# Patient Record
Sex: MALE | Race: WHITE | Employment: FULL TIME | URBAN - METROPOLITAN AREA
[De-identification: names, ages, dates, MRNs, and addresses within clinical notes are randomized per-mention and may not be internally consistent; named-entity substitution may affect disease eponyms.]

---

## 2017-04-14 ENCOUNTER — TELEPHONE (OUTPATIENT)
Dept: NURSING | Facility: CLINIC | Age: 42
End: 2017-04-14

## 2017-04-14 ENCOUNTER — OFFICE VISIT (OUTPATIENT)
Dept: FAMILY MEDICINE | Facility: CLINIC | Age: 42
End: 2017-04-14
Payer: COMMERCIAL

## 2017-04-14 VITALS
BODY MASS INDEX: 23.79 KG/M2 | SYSTOLIC BLOOD PRESSURE: 110 MMHG | HEIGHT: 68 IN | DIASTOLIC BLOOD PRESSURE: 75 MMHG | WEIGHT: 157 LBS | TEMPERATURE: 97.8 F | HEART RATE: 70 BPM

## 2017-04-14 DIAGNOSIS — R68.83 CHILLS (WITHOUT FEVER): ICD-10-CM

## 2017-04-14 DIAGNOSIS — J02.9 VIRAL PHARYNGITIS: Primary | ICD-10-CM

## 2017-04-14 LAB
DEPRECATED S PYO AG THROAT QL EIA: NORMAL
FLUAV+FLUBV AG SPEC QL: NEGATIVE
FLUAV+FLUBV AG SPEC QL: NORMAL
MICRO REPORT STATUS: NORMAL
SPECIMEN SOURCE: NORMAL
SPECIMEN SOURCE: NORMAL

## 2017-04-14 PROCEDURE — 87880 STREP A ASSAY W/OPTIC: CPT | Performed by: FAMILY MEDICINE

## 2017-04-14 PROCEDURE — 99213 OFFICE O/P EST LOW 20 MIN: CPT | Performed by: FAMILY MEDICINE

## 2017-04-14 PROCEDURE — 87081 CULTURE SCREEN ONLY: CPT | Performed by: FAMILY MEDICINE

## 2017-04-14 PROCEDURE — 87804 INFLUENZA ASSAY W/OPTIC: CPT | Performed by: FAMILY MEDICINE

## 2017-04-14 ASSESSMENT — PAIN SCALES - GENERAL: PAINLEVEL: MODERATE PAIN (5)

## 2017-04-14 NOTE — TELEPHONE ENCOUNTER
Call Type: Triage Call    Presenting Problem: Lonnie has sever back pain, started about 2 days  ago, worse yesterday. No known injury or trauma. Also deep cough.  Lower back pain, constant, central, rates pain 7/10. Pain is better  when not moving. Unsure of fever status, has not checked but feels  feverish.  Triage Note:  Guideline Title: Back Symptoms  Recommended Disposition: See Provider within 24 hours  Original Inclination: Wanted to speak with a nurse  Override Disposition:  Intended Action: See Dr/Jonas Appt  Physician Contacted: No  Pain intensifies with coughing, sneezing or straining ?  YES  New or worsening signs and symptoms that may indicate shock ? NO  Pregnant,  less than 20 weeks gestation ? NO  Severe back pain AND history of IV drug use, alcoholism, or previous spinal trauma  ? NO  Pregnant and gestation greater than 37 weeks AND low backache/pain that is  constant or increasing in intensity ? NO  Pregnant and gestation 20-37 weeks AND low backache/pain that is constant or  increasing in intensity ? NO  Following significant trauma or injury to neck or back AND immobile since event ?  NO  Pregnant, back symptoms AND no signs of labor ? NO  Burning or tingling feeling, itchiness or stabbing pain in a localized area on one  side of body followed by reddened fluid-filled blisters that break and crust ? NO  Back pain associated with any breathing symptoms (such as noisy breathing,  struggling to breathe, sudden change in respiratory rate) ? NO  New paralysis (unable to move); new weakness (not due to pain); new loss of  coordination (purposeful action) OR new unexplained numbness/tingling involving  arm and leg on same side of body ? NO  Following significant trauma AND has been mobile since injury but is now having  back or neck pain ? NO  Age 50 years or older with sudden onset of deep boring or tearing pain in back OR  abdomen; may radiate to groin, hips or lower extremities ? NO  Pain predominately  in flank area ? NO  Urinary tract symptoms are primary symptoms ? NO  Urinary tract symptoms are primary symptoms ? NO  New onset back pain associated with numbness or weakness in both legs ? NO  Fever of 100.5 F (38.1 C) or higher associated with back symptoms ? NO  Low back pain AND urinary tract symptoms ? NO  Back pain radiates into thigh or below knee ? NO  New onset or unexplained change in bowel or bladder control (unable to urinate and  full feeling or loss of control of bowel or bladder) ? NO  Numbness in the groin and saddle area of pelvis AND lower extremity weakness OR  change in bowel or bladder control (loss of control, retention, overflow) ? NO  Painful spasms or cramping of large muscle groups (back, legs or abdomen) AND  recent heat exposure ? NO  Any other cardiac signs/symptoms for more than 5 minutes, now or within last hour.  Pain is NOT associated with taking a deep breath or a productive cough, movement,  or touch to a localized area on the chest or upper body. ? NO  Pain predominately in the neck ? NO  New onset of severe disabling back pain (unable to stand upright; pain wakens you  from sleep; constant or intense pain when lying down) ? NO  Any temperature elevation in an immunocompromised individual OR frail elderly with  signs of dehydration ? NO  Physician Instructions:  Care Advice: Call provider if symptoms worsen or new symptoms develop.  Go to the ED if you have worsening pain, numbness or weakness of arms or  legs, cannot walk, or have new unexplained changes in bladder or bowel  function. Another adult should drive.  To relieve discomfort and swelling, apply a cloth-covered cold pack to the  area for 20 minutes 4 to 8 times a day for the first 24 to 48 hours. After  24 to 48 hours of cold application, use a cloth-covered heat pack to the  area for 20 minutes 3 to 4 times a day.

## 2017-04-14 NOTE — LETTER
[facility]  [address] [facility] [address]  Phone: [phone]    April 17, 2017    Lonnie Hester  [address] [place] [id number]              Dear Mr. Hester,        The results of your recent throat culture were negative. If you have any further questions or concerns please contact the clinic.          Sincerely,      Nicholas Moreno MD

## 2017-04-14 NOTE — NURSING NOTE
"Chief Complaint   Patient presents with     Musculoskeletal Problem       Initial /75  Pulse 70  Temp 97.8  F (36.6  C) (Tympanic)  Ht 5' 8\" (1.727 m)  Wt 157 lb (71.2 kg)  BMI 23.87 kg/m2 Estimated body mass index is 23.87 kg/(m^2) as calculated from the following:    Height as of this encounter: 5' 8\" (1.727 m).    Weight as of this encounter: 157 lb (71.2 kg).  Medication Reconciliation: complete     Gail Askew CMA      "

## 2017-04-14 NOTE — PATIENT INSTRUCTIONS
The flu will go away on its own after 3-5 fairly miserable days in most health people.  There is a medication to reduce the severity and duration by a day or so but it must be given within the first 24 hours of symptoms to be effective.  Additionally, this medication can be given to close contacts as well to prevent the illness.  This would be recommended for anyone who is unimmunized and is below a year of age, a nursing home resident or anyone with severe heart, lung or immune system issues.  As always, immunization in the Fall is the best approach to preventing serious illness and we recommend it for everyone who qualifies for immunization.     In the meantime, acetaminophen, ibuprofen or naproxen can help with pain and fever.  Drinking plenty of fluids will help with dehydration. Rest and time are important. Avoid unnecessary travel or contact with others.  If you do need to be around others, you should wear a mask.  If you have a worsening of symptoms including confusion, shortness of breath, severe chest pain, etc, you should seek care.

## 2017-04-14 NOTE — MR AVS SNAPSHOT
After Visit Summary   4/14/2017    Lonnie Hester    MRN: [id number]           Patient Information     Date Of Birth          1975        Visit Information        Provider Department      4/14/2017 10:00 AM Nicholas Moreno MD [facility]        Today's Diagnoses     Viral pharyngitis    -  1    Chills (without fever)          Care Instructions    The flu will go away on its own after 3-5 fairly miserable days in most health people.  There is a medication to reduce the severity and duration by a day or so but it must be given within the first 24 hours of symptoms to be effective.  Additionally, this medication can be given to close contacts as well to prevent the illness.  This would be recommended for anyone who is unimmunized and is below a year of age, a nursing home resident or anyone with severe heart, lung or immune system issues.  As always, immunization in the Fall is the best approach to preventing serious illness and we recommend it for everyone who qualifies for immunization.     In the meantime, acetaminophen, ibuprofen or naproxen can help with pain and fever.  Drinking plenty of fluids will help with dehydration. Rest and time are important. Avoid unnecessary travel or contact with others.  If you do need to be around others, you should wear a mask.  If you have a worsening of symptoms including confusion, shortness of breath, severe chest pain, etc, you should seek care.         Follow-ups after your visit        Who to contact     Normal or non-critical lab and imaging results will be communicated to you by [employer], letter or phone within 4 business days after the clinic has received the results. If you do not hear from us within 7 days, please contact the clinic through MyChart or phone. If you have a critical or abnormal lab result, we will notify you by phone as soon as possible.  Submit refill requests through [employer] or call your pharmacy and they will forward  "the refill request to us. Please allow 3 business days for your refill to be completed.          If you need to speak with a  for additional information , please call: [phone]           Additional Information About Your Visit        [employer] Information     [employer] gives you secure access to your electronic health record. If you see a primary care provider, you can also send messages to your care team and make appointments. If you have questions, please call your primary care clinic.  If you do not have a primary care provider, please call [phone] and they will assist you.        Care EveryWhere ID     This is your Care EveryWhere ID. This could be used by other organizations to access your [place] medical records  [id number]        Your Vitals Were     Pulse Temperature Height BMI (Body Mass Index)          70 97.8  F (36.6  C) (Tympanic) 5' 8\" (1.727 m) 23.87 kg/m2         Blood Pressure from Last 3 Encounters:   04/14/17 110/75   06/19/15 133/80   06/03/15 126/80    Weight from Last 3 Encounters:   04/14/17 157 lb (71.2 kg)   06/19/15 157 lb (71.2 kg)   06/03/15 156 lb 4.8 oz (70.9 kg)              We Performed the Following     Beta strep group A culture     Influenza A/B antigen     Rapid strep screen        Primary Care Provider    Doctor Unknown, MD       No address on file        Thank you!     Thank you for choosing [facility]  for your care. Our goal is always to provide you with excellent care. Hearing back from our patients is one way we can continue to improve our services. Please take a few minutes to complete the written survey that you may receive in the mail after your visit with us. Thank you!             Your Updated Medication List - Protect others around you: Learn how to safely use, store and throw away your medicines at www.disposemymeds.org.          This list is accurate as of: 4/14/17 10:41 AM.  Always use your most recent med list.             "       Brand Name Dispense Instructions for use    ascorbic acid 1000 MG Tabs    vitamin C     Take 1,000 mg by mouth daily Reported on 4/14/2017       tiZANidine 4 MG tablet    ZANAFLEX    90 tablet    Take 1 tablet (4 mg) by mouth 3 times daily as needed for muscle spasms

## 2017-04-14 NOTE — PROGRESS NOTES
".  SUBJECTIVE:                                                    Lonnie Hester is a 42 year old male who presents to clinic today for the following health issues:      ENT Symptoms             Symptoms: cc Present Absent Comment   Fever/Chills  x     Fatigue  x     Muscle Aches  x     Eye Irritation   x    Sneezing   x    Nasal Art/Drg  x     Sinus Pressure/Pain   x    Loss of smell   x    Dental pain   x    Sore Throat  x     Swollen Glands       Ear Pain/Fullness   x    Cough  x     Wheeze  x     Chest Pain   x    Shortness of breath  x     Rash   x    Other   x      Symptom duration:  since Wednesday    Symptom severity:  moderate   Treatments tried:  Advil cold and sinus    Contacts:  children              Problem list and histories reviewed & adjusted, as indicated.  Additional history: as documented        Reviewed and updated as needed this visit by clinical staff  Tobacco  Allergies  Meds  Med Hx  Surg Hx  Fam Hx  Soc Hx      Reviewed and updated as needed this visit by Provider         1. Viral pharyngitis    2. Chills (without fever)        PMH: Updated and/or reviewed in chart.    PSH: Updated and/or reviewed in chart.    Family History: Updated and/or reviewed in chart.     ROS:  Constitutional, neuro, EMT, endocrine, pulmonary, cardiac, gastrointestinal, genitourinary, musculoskeletal, integument and psychiatric systems are otherwise negative.    OBJECTIVE:                                                    /75  Pulse 70  Temp 97.8  F (36.6  C) (Tympanic)  Ht 5' 8\" (1.727 m)  Wt 157 lb (71.2 kg)  BMI 23.87 kg/m2  GENERAL: Interactive.  Alert and oriented x 3.  No acute distress but uncomfortable  HEENT: Normocephalic, atraumatic. PEERRLA, EOMI.  Scleras, lids and conjunctivae normal. Pinnas, canals and TM's clear.  Nose and oropharynx moist and clear.  NECK: supple and free of adenopathy or masses, the thyroid is normal without enlargement or nodules.  HEART:  S1 and S2 normal, no " murmurs, clicks, gallops or rubs. Regular rate and rhythm.  CHEST:  clear, no wheezing or rales. Normal symmetric air entry throughout both lung fields. No chest wall deformities or tenderness.  SKIN:  Only benign skin findings. No unusual rashes or suspicious skin lesions noted. Nails appear normal.        Results for orders placed or performed in visit on 04/14/17   Influenza A/B antigen   Result Value Ref Range    Influenza A/B Agn Specimen Nasopharyngeal     Influenza A Negative NEG    Influenza B  NEG     Negative   Test results must be correlated with clinical data. If necessary, results   should be confirmed by a molecular assay or viral culture.     Rapid strep screen   Result Value Ref Range    Specimen Description Throat     Rapid Strep A Screen       NEGATIVE: No Group A streptococcal antigen detected by immunoassay, await   culture report.      Micro Report Status FINAL 04/14/2017       ASSESSMENT/PLAN:                                                        ICD-10-CM    1. Viral pharyngitis J02.9 Rapid strep screen     Beta strep group A culture   2. Chills (without fever) R68.83 Influenza A/B antigen     Beta strep group A culture       Care plan updated in chart for chronic problems.    Patient Instructions   The flu will go away on its own after 3-5 fairly miserable days in most health people.  There is a medication to reduce the severity and duration by a day or so but it must be given within the first 24 hours of symptoms to be effective.  Additionally, this medication can be given to close contacts as well to prevent the illness.  This would be recommended for anyone who is unimmunized and is below a year of age, a nursing home resident or anyone with severe heart, lung or immune system issues.  As always, immunization in the Fall is the best approach to preventing serious illness and we recommend it for everyone who qualifies for immunization.     In the meantime, acetaminophen, ibuprofen or  naproxen can help with pain and fever.  Drinking plenty of fluids will help with dehydration. Rest and time are important. Avoid unnecessary travel or contact with others.  If you do need to be around others, you should wear a mask.  If you have a worsening of symptoms including confusion, shortness of breath, severe chest pain, etc, you should seek care.      No orders of the defined types were placed in this encounter.     Goals     None           Nicholas Moreno MD

## 2017-04-17 LAB
BACTERIA SPEC CULT: NORMAL
MICRO REPORT STATUS: NORMAL
SPECIMEN SOURCE: NORMAL

## 2018-08-27 ENCOUNTER — ALLIED HEALTH/NURSE VISIT (OUTPATIENT)
Dept: NURSING | Facility: CLINIC | Age: 43
End: 2018-08-27
Payer: COMMERCIAL

## 2018-08-27 DIAGNOSIS — H93.8X3 PLUGGED FEELING IN EAR, BILATERAL: Primary | ICD-10-CM

## 2018-08-27 PROCEDURE — 99207 ZZC NO CHARGE NURSE ONLY: CPT

## 2018-08-27 NOTE — PROGRESS NOTES
RN ear assessment completed prior to ear irrigation. Otoscopic exam reveals cerumen present and irrigation advised. Post Ear Irrigation exam completed and cerumen plug removed.  Pain assessment completed. none tolerated well   Irrigation done by CMA with out problems good results   Patient tolerated procedure:  yes     Ok to do ear wash per Isaías  Due to pres provider no longer with clinic  advised to make appt to establish care for furture needs  DARREN Andre  Clinic  MARJORIE/Bharat Poe

## 2018-08-27 NOTE — MR AVS SNAPSHOT
After Visit Summary   8/27/2018    Lonnie Hester    MRN: [id number]           Patient Information     Date Of Birth          1975        Visit Information        Provider Department      8/27/2018 8:30 AM FL DANIEL AMADOR [place] [facility] Poe        Today's Diagnoses     Plugged feeling in ear, bilateral    -  1       Follow-ups after your visit        Who to contact     If you have questions or need follow up information about today's clinic visit or your schedule please contact [facility] [place] directly at [phone].  Normal or non-critical lab and imaging results will be communicated to you by MyChart, letter or phone within 4 business days after the clinic has received the results. If you do not hear from us within 7 days, please contact the clinic through [employer] or phone. If you have a critical or abnormal lab result, we will notify you by phone as soon as possible.  Submit refill requests through [employer] or call your pharmacy and they will forward the refill request to us. Please allow 3 business days for your refill to be completed.          Additional Information About Your Visit        MyChart Information     [employer] gives you secure access to your electronic health record. If you see a primary care provider, you can also send messages to your care team and make appointments. If you have questions, please call your primary care clinic.  If you do not have a primary care provider, please call [phone] and they will assist you.        Care EveryWhere ID     This is your Care EveryWhere ID. This could be used by other organizations to access your [place] medical records  [id number]         Blood Pressure from Last 3 Encounters:   04/14/17 110/75   06/19/15 133/80   06/03/15 126/80    Weight from Last 3 Encounters:   04/14/17 157 lb (71.2 kg)   06/19/15 157 lb (71.2 kg)   06/03/15 156 lb 4.8 oz (70.9 kg)              Today, you had the following     No orders found for  display       Primary Care Provider Fax #    Physician No Ref-Primary [phone]       No address on file        Equal Access to Services     KAMAR MELGAR : Hadii aad ku hadsuraj Arellano, yuriyda baldohannah, dylon kirkpatrickda shekhar, brigid jose antonioin hayaabernardo alexjorge parker laLisaraúl harrison. So [facility] [phone].    ATENCIÓN: Si habla español, tiene a lance disposición servicios gratuitos de asistencia lingüística. Llame al [phone].    We comply with applicable federal civil rights laws and [place] laws. We do not discriminate on the basis of race, color, national origin, age, disability, sex, sexual orientation, or gender identity.            Thank you!     Thank you for choosing [facility]  for your care. Our goal is always to provide you with excellent care. Hearing back from our patients is one way we can continue to improve our services. Please take a few minutes to complete the written survey that you may receive in the mail after your visit with us. Thank you!             Your Updated Medication List - Protect others around you: Learn how to safely use, store and throw away your medicines at www.disposemymeds.org.          This list is accurate as of 8/27/18 11:24 AM.  Always use your most recent med list.                   Brand Name Dispense Instructions for use Diagnosis    ascorbic acid 1000 MG Tabs    vitamin C     Take 1,000 mg by mouth daily Reported on 4/14/2017        tiZANidine 4 MG tablet    ZANAFLEX    90 tablet    Take 1 tablet (4 mg) by mouth 3 times daily as needed for muscle spasms    Sprain of neck, initial encounter

## 2019-01-14 NOTE — PROGRESS NOTES
SUBJECTIVE:   Lonnie Hester is a 43 year old male who presents to clinic today for the following health issues:      Chief Complaint   Patient presents with     Abdominal Pain     abdominal pain x1 month       Past Medical History:   Diagnosis Date     NO ACTIVE PROBLEMS        Past Surgical History:   Procedure Laterality Date     NO HISTORY OF SURGERY         Family History   Problem Relation Age of Onset     C.A.D. No family hx of      Diabetes No family hx of      Hypertension No family hx of      Cerebrovascular Disease No family hx of      Breast Cancer No family hx of      Cancer - colorectal No family hx of      Prostate Cancer No family hx of        Social History     Tobacco Use     Smoking status: Never Smoker     Smokeless tobacco: Never Used   Substance Use Topics     Alcohol use: Yes     Comment: 2 drinks per week      No Known Allergies    Current Outpatient Medications   Medication     ascorbic acid (VITAMIN C) 1000 MG TABS     tiZANidine (ZANAFLEX) 4 MG tablet     No current facility-administered medications for this visit.        Reviewed and updated as needed this visit by clinical staff  Tobacco  Allergies  Meds  Problems  Med Hx  Surg Hx  Fam Hx  Soc Hx        Reviewed and updated as needed this visit by Provider  Tobacco  Allergies  Meds  Problems  Med Hx  Surg Hx  Fam Hx       1. Lower abdominal pain: Right sided.  Gets worse with bowling and sneezing.  Gets worse wearing a tool belt ().  Ongoing for the past year but has been getting worse the past month.  Gets worse with abduction of right leg.  No issues with bowel movements.  No abdominal surgery.  Not associated with food intake.  No issues with urination.  States of 8/10 with sneezing.    ROS:  Review of Systems   Constitutional: Negative for activity change, appetite change, chills, fatigue and fever.   Respiratory: Negative for cough, shortness of breath and wheezing.    Cardiovascular: Negative for chest  "pain, palpitations and leg swelling.   Genitourinary:        States of right lower inguinal pain   Skin: Negative for color change, rash and wound.   Neurological: Negative for dizziness, weakness and headaches.   Psychiatric/Behavioral: Negative for agitation and decreased concentration. The patient is not nervous/anxious.        OBJECTIVE:     /76 (BP Location: Left arm, Cuff Size: Adult Regular)   Pulse 72   Temp 97.7  F (36.5  C) (Tympanic)   Ht 1.727 m (5' 8\")   Wt 72 kg (158 lb 12.8 oz)   SpO2 99%   BMI 24.15 kg/m    Body mass index is 24.15 kg/m .  Physical Exam   Constitutional: He is oriented to person, place, and time. He appears well-developed and well-nourished. No distress.   HENT:   Head: Normocephalic and atraumatic.   Nose: Nose normal.   Eyes: Conjunctivae and EOM are normal.   Neck: Normal range of motion. No tracheal deviation present.   Cardiovascular: Normal rate, regular rhythm and normal heart sounds.   Pulmonary/Chest: Effort normal. He has no wheezes.   Abdominal: Soft. He exhibits no mass. There is no tenderness. There is no guarding.   Right inguinal region: reduceable bulge involving right inguinal region.    Musculoskeletal: Normal range of motion.   Neurological: He is alert and oriented to person, place, and time.   Skin: No rash noted. No erythema.   Psychiatric: He has a normal mood and affect. His behavior is normal.     ASSESSMENT/PLAN:     1. Unilateral inguinal hernia without obstruction or gangrene, recurrence not specified  Advised to avoid heavy lifting.  Encourage boxer briefs.   - GENERAL SURG ADULT REFERRAL    See Patient Instructions    Luis Alberto Colin,   [facility]    "

## 2019-01-15 ENCOUNTER — OFFICE VISIT (OUTPATIENT)
Dept: FAMILY MEDICINE | Facility: CLINIC | Age: 44
End: 2019-01-15
Payer: COMMERCIAL

## 2019-01-15 VITALS
BODY MASS INDEX: 24.07 KG/M2 | WEIGHT: 158.8 LBS | HEART RATE: 72 BPM | OXYGEN SATURATION: 99 % | DIASTOLIC BLOOD PRESSURE: 76 MMHG | TEMPERATURE: 97.7 F | HEIGHT: 68 IN | SYSTOLIC BLOOD PRESSURE: 116 MMHG

## 2019-01-15 DIAGNOSIS — K40.90 UNILATERAL INGUINAL HERNIA WITHOUT OBSTRUCTION OR GANGRENE, RECURRENCE NOT SPECIFIED: Primary | ICD-10-CM

## 2019-01-15 PROCEDURE — 99213 OFFICE O/P EST LOW 20 MIN: CPT | Performed by: FAMILY MEDICINE

## 2019-01-15 ASSESSMENT — ENCOUNTER SYMPTOMS
NERVOUS/ANXIOUS: 0
APPETITE CHANGE: 0
WEAKNESS: 0
PALPITATIONS: 0
ACTIVITY CHANGE: 0
CHILLS: 0
DIZZINESS: 0
WOUND: 0
DECREASED CONCENTRATION: 0
AGITATION: 0
COLOR CHANGE: 0
WHEEZING: 0
HEADACHES: 0
FEVER: 0
FATIGUE: 0
SHORTNESS OF BREATH: 0
COUGH: 0

## 2019-01-15 ASSESSMENT — MIFFLIN-ST. JEOR: SCORE: 1599.81

## 2019-01-15 NOTE — PATIENT INSTRUCTIONS
Epifanio Hester,    Thank you for allowing [facility] to manage your care.    I made a general surgery referral, please call to scheduled/they will be in 1-2 weeks to set up your appointment.  If you do not hear from them, please call the specialty number on your after visit.     Avoid heavy lifting and avoiding straining.     If you have any questions or concerns, please feel free to call us at [phone].    Sincerely,    Dr. Colin        Patient Education     Hernia (Adult)    A hernia can happen when there is a weakness or defect in the wall of the abdomen or groin. Intestines or nearby tissues may move from their usual location and push through the weakness in the wall. This can cause a hernia (bulge) you may see or feel.  Causes and risk factors   A hernia may be present at birth. Or it may be caused by the wear and tear of daily living. Certain factors can make a hernia more likely. These can include:    Heavy lifting    Straining, whether from lifting, movement, or constipation    Chronic cough    Injury to the abdominal wall    Excess weight    Pregnancy    Prior surgery    Older age    Family history of hernia  Symptoms  Symptoms of a hernia may come on suddenly. Or they may appear slowly over time. Some common symptoms include:    Bulge in the groin area, around the navel, or in the scrotum (the bulge may get bigger when you stand and go away when you lie down)    Pain or pressure around the bulge    Pain during activities such as lifting, coughing, or sneezing    A feeling of weakness or pressure in the groin    Pain or swelling in the scrotum  Types of hernias  There are different types of hernia. The type you have depends on its location:    Inguinal. This type is in the groin or scrotum. It is more common in men. But, women can get this hernia, too.    Femoral. This type is in the groin, upper thigh (where the leg bends), or labia. It is more common in women.    Ventral. This type is in the  abdominal wall.    Umbilical. This type occurs around the navel (belly button).    Incisional. This type occurs at the site of a previous surgery.  The condition of the hernia can help determine how urgently it needs to be treated.    Reducible. It goes back in by itself, or it can be pushed back in.    Irreducible. It can t be pushed back in.    Incarcerated/strangulated. The intestine is trapped (incarcerated). If this happens, you won t be able to push the bulge back in. If the incarcerated hernia isn t treated, it may become strangulated. This means the area loses blood supply and the tissue may die. This requires emergency surgery. You need treatment right away.  In most cases, a hernia will not heal on its own.You may need surgery to repair the defect in the abdominal wall or groin. You ll be told more about surgery, if needed.  If your symptoms are not severe, treatment may sometimes be delayed. In such cases, you will need regular follow-up visits with the provider. You ll be asked to keep track of your symptoms and to watch for signs of more serious problems. You may also be given guidelines similar to the home care instructions below.  Home care  To help keep a hernia from getting worse, you may be advised to:    Avoid heavy lifting and straining as directed.    Take steps to prevent constipation, such as eating more fiber and drinking more water. This may help reduce straining that can occur when having a bowel movement. Reducing straining may help keep your symptoms from getting worse.    Maintain a healthy weight or lose excess weight. This can help reduce strain on abdominal muscles and tissues.    Stop smoking. This can help prevent coughing that may also strain abdominal muscles and tissues.  Follow-up care  Follow up with your healthcare provider, or as directed. If imaging tests were done, they will be reviewed a doctor. You will be told the results and any new findings that may affect your  care.  When to seek medical advice  Call your healthcare provider right away if any of these occur:    Hernia hardens, swells, or grows larger    Hernia can no longer be pushed back in    Pain moves to the lower right abdomen (just below the waistline), or spreads to the back  Call 911  Call 911 if any of these occur:    Severe pain, redness, or tenderness in the area near the hernia    Pain worsens quickly and doesn t get better    Inability to have a bowel movement or pass gas    Fever of 100.4 F (38 C) or higher, or as directed by your healthcare provider  Date Last Reviewed: 3/1/2018    9943-3361 [employer]. [address] [place] [address], [place] [address]. All rights reserved. This information is not intended as a substitute for professional medical care. Always follow your healthcare professional's instructions.

## 2019-01-22 ENCOUNTER — TELEPHONE (OUTPATIENT)
Dept: SURGERY | Facility: CLINIC | Age: 44
End: 2019-01-22

## 2019-01-22 ENCOUNTER — OFFICE VISIT (OUTPATIENT)
Dept: SURGERY | Facility: CLINIC | Age: 44
End: 2019-01-22
Payer: COMMERCIAL

## 2019-01-22 VITALS
HEIGHT: 68 IN | WEIGHT: 160 LBS | DIASTOLIC BLOOD PRESSURE: 79 MMHG | HEART RATE: 68 BPM | SYSTOLIC BLOOD PRESSURE: 136 MMHG | BODY MASS INDEX: 24.25 KG/M2

## 2019-01-22 DIAGNOSIS — K40.90 RIGHT INGUINAL HERNIA: Primary | ICD-10-CM

## 2019-01-22 PROCEDURE — 99204 OFFICE O/P NEW MOD 45 MIN: CPT | Performed by: SURGERY

## 2019-01-22 ASSESSMENT — MIFFLIN-ST. JEOR: SCORE: 1605.26

## 2019-01-22 NOTE — PROGRESS NOTES
"Dear Luis Alberto Reynolds  I was asked to see this patient by Luis Alberto Colin for please see below.  I have seen Lonnie Hester and as you know his chief complaint is right groin pain .   symptom started about a year ago but getting worse the last month.   Sleeping and moving at night  and slipping on ice makes it more uncomfortable.   Denies nausea, vomitting, diahrrea, but some constipation started taking mary lax lately  No bladder outlet obstruction symptoms   Non smoker    HPI:  Patient is a 43 year old male  with complaints right groin pain     Patient has family history of hernia problems  father  trying to avoid certain movements makes the episode better.  Denies any recent trauma      Review Of Systems  Respiratory: No shortness of breath, dyspnea on exertion, cough, or hemoptysis  Cardiovascular: negative  Gastrointestinal: negative  Endocrine: negative  :  negative  /79   Pulse 68   Ht 1.727 m (5' 8\")   Wt 72.6 kg (160 lb)   BMI 24.33 kg/m      Past Medical History:   Diagnosis Date     NO ACTIVE PROBLEMS        Past Surgical History:   Procedure Laterality Date     NO HISTORY OF SURGERY         Social History     Socioeconomic History     Marital status:      Spouse name: Not on file     Number of children: 2     Years of education: Not on file     Highest education level: Not on file   Social Needs     Financial resource strain: Not on file     Food insecurity - worry: Not on file     Food insecurity - inability: Not on file     Transportation needs - medical: Not on file     Transportation needs - non-medical: Not on file   Occupational History     Occupation:    Tobacco Use     Smoking status: Never Smoker     Smokeless tobacco: Never Used   Substance and Sexual Activity     Alcohol use: Yes     Comment: 2 drinks per week     Drug use: No     Sexual activity: Yes     Partners: Female     Birth control/protection: Surgical     Comment: vas   Other Topics Concern     " "Parent/sibling w/ CABG, MI or angioplasty before 65F 55M? No   Social History Narrative     Not on file       Current Outpatient Medications   Medication Sig Dispense Refill     ascorbic acid (VITAMIN C) 1000 MG TABS Take 1,000 mg by mouth daily Reported on 4/14/2017       tiZANidine (ZANAFLEX) 4 MG tablet Take 1 tablet (4 mg) by mouth 3 times daily as needed for muscle spasms (Patient not taking: Reported on 4/14/2017) 90 tablet 1       10 Point review of systems all others are negative.   Above was reviewed  Physical exam: /79   Pulse 68   Ht 1.727 m (5' 8\")   Wt 72.6 kg (160 lb)   BMI 24.33 kg/m     Patient able to get up on table without difficulty.   Patient is alert and orientated.   Head eyes, nose and mouth within normal limits.  No supraclavicular or cervical adenopathy palpated.  Thyroid within normal limits.  No carotid bruits auscultated.  Lungs are clear to auscultation  Heart is regular rate and rhythm with no murmur or thrills noted.  No costal vertebral angle tenderness noted.  Abdomen is abdomen is soft without significant tenderness, masses, organomegaly or guarding  bowel sounds are positive and no caput medusa noted.  Has weakness on right side probable right inguinal hernia  no left inguinal hernia or umbilical hernias noted.   Testicles are normal.    Skin was warm and pink  Normal Affect    Lower extremity edema is not present.      Assessment: right inguinal hernia or at least weakness and reproduce the pain with the exam.    Plan to do discussed laparoscopic robotic and open with just one side and get this done sooner as this is a slow time for him at work.   Right inguinal hernia repair open with mesh  Risks of surgery include damage to nerves, bleeding, infection, damage to  Vessels, recurrence.  Although mesh is a better long term repair if it gets infected it must be removed.   If the patient has any bacterial infection the week before and is seen by their doctor and started on " antibiotics, I can probably still do the surgery if they are vastly improved by the time of surgery, but if the infection starts closer to the surgery date it will be better to cancel and reschedule to a later date.  A cough will also be hard on the repair and uncomfortable post operative.  If the patient is a smoker I did discuss increase risk of recurrence and more pain with the cough.  If the patient is willing to quit smoking would encourage to do so and start at least a week before surgery.  However, if patient is not going to quit then must understand that his repair is more likely to fail.    Risks of surgery discussed including, but not limited to bleeding, infection, recurrence, damage to nerves and what is in the hernia sac.  Risks of anesthesia also discussed.    Discussed massaging hernia back in and using ice if becomes more painful.  If not able to reduce then go to emergency room.  Also discussed hernia belt to use until able to get in for surgery.    Time spent with the patient with greater that 50% of the time in discussion was 30 minutes.  In discussing the hernia and options we did discuss doing motrin but patient has had an ulcer and it is definitely a weak area here so feel that it is a hernia. .      Fidencio Barajas MD

## 2019-01-22 NOTE — LETTER
"    1/22/2019         RE: Lonnie Hester  [address] [facility] [address]        Dear Colleague,    Thank you for referring your patient, Lonnie Hester, to the [facility]. Please see a copy of my visit note below.    Dear Luis Alberto Reynolds  I was asked to see this patient by Luis Alberto Colin for please see below.  I have seen Lonnie Hester and as you know his chief complaint is right groin pain .   symptom started about a year ago but getting worse the last month.   Sleeping and moving at night  and slipping on ice makes it more uncomfortable.   Denies nausea, vomitting, diahrrea, but some constipation started taking mary lax lately  No bladder outlet obstruction symptoms   Non smoker    HPI:  Patient is a 43 year old male  with complaints right groin pain     Patient has family history of hernia problems  father  trying to avoid certain movements makes the episode better.  Denies any recent trauma      Review Of Systems  Respiratory: No shortness of breath, dyspnea on exertion, cough, or hemoptysis  Cardiovascular: negative  Gastrointestinal: negative  Endocrine: negative  :  negative  /79   Pulse 68   Ht 1.727 m (5' 8\")   Wt 72.6 kg (160 lb)   BMI 24.33 kg/m       Past Medical History:   Diagnosis Date     NO ACTIVE PROBLEMS        Past Surgical History:   Procedure Laterality Date     NO HISTORY OF SURGERY         Social History     Socioeconomic History     Marital status:      Spouse name: Not on file     Number of children: 2     Years of education: Not on file     Highest education level: Not on file   Social Needs     Financial resource strain: Not on file     Food insecurity - worry: Not on file     Food insecurity - inability: Not on file     Transportation needs - medical: Not on file     Transportation needs - non-medical: Not on file   Occupational History     Occupation:    Tobacco Use     Smoking status: Never Smoker     Smokeless tobacco: Never " "Used   Substance and Sexual Activity     Alcohol use: Yes     Comment: 2 drinks per week     Drug use: No     Sexual activity: Yes     Partners: Female     Birth control/protection: Surgical     Comment: vas   Other Topics Concern     Parent/sibling w/ CABG, MI or angioplasty before 65F 55M? No   Social History Narrative     Not on file       Current Outpatient Medications   Medication Sig Dispense Refill     ascorbic acid (VITAMIN C) 1000 MG TABS Take 1,000 mg by mouth daily Reported on 4/14/2017       tiZANidine (ZANAFLEX) 4 MG tablet Take 1 tablet (4 mg) by mouth 3 times daily as needed for muscle spasms (Patient not taking: Reported on 4/14/2017) 90 tablet 1       10 Point review of systems all others are negative.   Above was reviewed  Physical exam: /79   Pulse 68   Ht 1.727 m (5' 8\")   Wt 72.6 kg (160 lb)   BMI 24.33 kg/m      Patient able to get up on table without difficulty.   Patient is alert and orientated.   Head eyes, nose and mouth within normal limits.  No supraclavicular or cervical adenopathy palpated.  Thyroid within normal limits.  No carotid bruits auscultated.  Lungs are clear to auscultation  Heart is regular rate and rhythm with no murmur or thrills noted.  No costal vertebral angle tenderness noted.  Abdomen is abdomen is soft without significant tenderness, masses, organomegaly or guarding  bowel sounds are positive and no caput medusa noted.  Has weakness on right side probable right inguinal hernia  no left inguinal hernia or umbilical hernias noted.   Testicles are normal.    Skin was warm and pink  Normal Affect    Lower extremity edema is not present.      Assessment: right inguinal hernia or at least weakness and reproduce the pain with the exam.    Plan to do discussed laparoscopic robotic and open with just one side and get this done sooner as this is a slow time for him at work.   Right inguinal hernia repair open with mesh  Risks of surgery include damage to nerves, " bleeding, infection, damage to  Vessels, recurrence.  Although mesh is a better long term repair if it gets infected it must be removed.   If the patient has any bacterial infection the week before and is seen by their doctor and started on antibiotics, I can probably still do the surgery if they are vastly improved by the time of surgery, but if the infection starts closer to the surgery date it will be better to cancel and reschedule to a later date.  A cough will also be hard on the repair and uncomfortable post operative.  If the patient is a smoker I did discuss increase risk of recurrence and more pain with the cough.  If the patient is willing to quit smoking would encourage to do so and start at least a week before surgery.  However, if patient is not going to quit then must understand that his repair is more likely to fail.    Risks of surgery discussed including, but not limited to bleeding, infection, recurrence, damage to nerves and what is in the hernia sac.  Risks of anesthesia also discussed.    Discussed massaging hernia back in and using ice if becomes more painful.  If not able to reduce then go to emergency room.  Also discussed hernia belt to use until able to get in for surgery.    Time spent with the patient with greater that 50% of the time in discussion was 30 minutes.  In discussing the hernia and options we did discuss doing motrin but patient has had an ulcer and it is definitely a weak area here so feel that it is a hernia. .      Fidencio Barajas MD      Again, thank you for allowing me to participate in the care of your patient.        Sincerely,        Fidencio Barajas MD

## 2019-01-22 NOTE — TELEPHONE ENCOUNTER
Type of surgery: open right inguinal hernia repair with mesh  CPT 35410  Right inguinal hernia [K40.90]  - Primary   Location of surgery: [facility]  Date and time of surgery: 2-13-19  TBD  Surgeon: Dr Barajas  Pre-Op Appt Date: 1-28-19  Post-Op Appt Date: 2-26-19   Packet sent out: Yes  Pre-cert/Authorization completed:  No pre cert needed  Date: 01/22/2019

## 2019-01-22 NOTE — PATIENT INSTRUCTIONS
Assessment: right inguinal hernia or at least weakness and reproduce the pain with the exam.    Plan to do discussed laparoscopic robotic and open with just one side and get this done sooner as this is a slow time for him at work.   Right inguinal hernia repair open with mesh  Risks of surgery include damage to nerves, bleeding, infection, damage to  Vessels, recurrence.  Although mesh is a better long term repair if it gets infected it must be removed.   If the patient has any bacterial infection the week before and is seen by their doctor and started on antibiotics, I can probably still do the surgery if they are vastly improved by the time of surgery, but if the infection starts closer to the surgery date it will be better to cancel and reschedule to a later date.  A cough will also be hard on the repair and uncomfortable post operative.  If the patient is a smoker I did discuss increase risk of recurrence and more pain with the cough.  If the patient is willing to quit smoking would encourage to do so and start at least a week before surgery.  However, if patient is not going to quit then must understand that his repair is more likely to fail.    Risks of surgery discussed including, but not limited to bleeding, infection, recurrence, damage to nerves and what is in the hernia sac.  Risks of anesthesia also discussed.    Discussed massaging hernia back in and using ice if becomes more painful.  If not able to reduce then go to emergency room.  Also discussed hernia belt to use until able to get in for surgery.      HERNIORRHAPHY DISCHARGE INSTRUCTIONS  DR. MAXI THOMPSON    1. You may resume your regular diet when you feel you are ready to. DO NOT drink alcoholic beverages for 24 hours or while you are taking prescription medication.    2. Limit your activities for the first 48 hours. Gradually, increase them as tolerated. You may use stairs. I encourage you to walk as tolerated. No lifting greater that 20  pounds for 3 weeks.    3. You will have some discomfort at the incision sites. This is expected. This should improve over the next 2-3 days. Ice and pain medication will help with this pain. Use prescribed pain medication as instructed.    4. Bruising and mild swelling is normal after surgery. For males it is common to have bruising going into the penis and scrotum. The area below and around the incision(s) will be hard and elevated. This is normal. I call it the healing ridge. This will resolve slowly over the next several months. If you feel the pain is increasing and cannot explain it by increasing activity please call us at [phone].    5. The dressing will often have some blood on it. You may shower 24 hours after surgery. Clean gently over incision site. If clear plastic covering or steri-strip comes off and there is still some bleeding or drainage then cover with gauze or band-aid. If no bleeding, there is no reason to cover site. The abdominal binder may be removed after 24 hours after surgery. You may continue to wear it however for comfort. I suggest  you wear an old t-shirt under the abdominal binder for a more comfortable wear.    6. Avoid Aspirin for the first 72 hours after the procedure. This medication may increase the tendency to bleed.    7. Use the following medications (in addition to your normal meds) as shown:  a. Percocet 5 mg 1-2 every 6 hours as needed for severe pain. This contains 325 mg of Tylenol (acetaminophen) per tablet.  Please do not take more than 4 grams of Tylenol (acetaminophen) per day. For example, you may take 1 Percocet and 1 Tylenol, or 2 Percocet and no Tylenol, or 2 Tylenol and no Percocet every 6 hours.  b. Tylenol (acetaminophen) 500 mg every 6 hours as needed for mild pain. Do not take more than 1000 mg every 6 hours. (see above).  c. Motrin (ibuprofen) 200-800 mg every 6 hours as needed for mild to moderate pain. Take with food.     8. Notify Dr. Barajas's  clinic at [phone] if:    Your discomfort is not relieved by your pain medication.    You have signs of infection such as temperature above 100.5 degrees orally, chills, or increasing daily discomfort.    Incision site is becoming more red and/or there is purulent drainage.    You have questions or concerns.    9. Please call [phone] to schedule a follow up appointment in about 2 weeks.    10. When taking narcotics (pain medication more than Tylenol [acetaminophen] and Motrin [ibuprofen]) it is important to keep your stools soft to avoid constipation and pain with straining. This is best done by drinking fluids (non-alcoholic and non-caffeinated) and taking a stool softener (i.e. Metamucil or milk of magnesia). You may be able to use non-narcotics for pain relief especially by the 3rd post- operative day. Tylenol (acetaminophen) 500 mg every 6 hours and/or Motrin (ibuprofen) 200-800 mg every 6 hours. Please do not take more than 4 grams of Tylenol (acetaminophen) per day. Remember your Percocet does have Tylenol (acetaminophen) already in it. Please take Motrin (ibuprofen) with food to help protect the stomach. If you have a history of stomach ulcers or stomach problems, do not take Motrin (ibuprofen).     11. Do not drive or operate heavy machinery for 24 hours after surgery or when taking narcotics. You may resume driving when feel that you can safely avoid an accident and are not taking narcotics. This is usually 5 to 7 days after surgery. You should not be alone for 24 hours after surgery.    12. Have milk of magnesia available at home so that when you take the pain medications you take 1-2 teaspoons a day,  to help reduce problems with constipation.

## 2019-01-28 ENCOUNTER — OFFICE VISIT (OUTPATIENT)
Dept: FAMILY MEDICINE | Facility: CLINIC | Age: 44
End: 2019-01-28
Payer: COMMERCIAL

## 2019-01-28 VITALS
HEIGHT: 68 IN | OXYGEN SATURATION: 99 % | SYSTOLIC BLOOD PRESSURE: 128 MMHG | WEIGHT: 158.6 LBS | HEART RATE: 66 BPM | BODY MASS INDEX: 24.04 KG/M2 | DIASTOLIC BLOOD PRESSURE: 76 MMHG | TEMPERATURE: 98.3 F

## 2019-01-28 DIAGNOSIS — Z01.818 PREOP GENERAL PHYSICAL EXAM: Primary | ICD-10-CM

## 2019-01-28 DIAGNOSIS — K40.90 RIGHT INGUINAL HERNIA: ICD-10-CM

## 2019-01-28 LAB
ANION GAP SERPL CALCULATED.3IONS-SCNC: 6 MMOL/L (ref 3–14)
BASOPHILS # BLD AUTO: 0 10E9/L (ref 0–0.2)
BASOPHILS NFR BLD AUTO: 0.2 %
BUN SERPL-MCNC: 19 MG/DL (ref 7–30)
CALCIUM SERPL-MCNC: 9 MG/DL (ref 8.5–10.1)
CHLORIDE SERPL-SCNC: 102 MMOL/L (ref 94–109)
CO2 SERPL-SCNC: 32 MMOL/L (ref 20–32)
CREAT SERPL-MCNC: 0.96 MG/DL (ref 0.66–1.25)
DIFFERENTIAL METHOD BLD: NORMAL
EOSINOPHIL # BLD AUTO: 0.3 10E9/L (ref 0–0.7)
EOSINOPHIL NFR BLD AUTO: 3.1 %
ERYTHROCYTE [DISTWIDTH] IN BLOOD BY AUTOMATED COUNT: 12.2 % (ref 10–15)
GFR SERPL CREATININE-BSD FRML MDRD: >90 ML/MIN/{1.73_M2}
GLUCOSE SERPL-MCNC: 87 MG/DL (ref 70–99)
HCT VFR BLD AUTO: 43.2 % (ref 40–53)
HGB BLD-MCNC: 14.8 G/DL (ref 13.3–17.7)
INR PPP: 0.94 (ref 0.86–1.14)
LYMPHOCYTES # BLD AUTO: 2.5 10E9/L (ref 0.8–5.3)
LYMPHOCYTES NFR BLD AUTO: 25.7 %
MCH RBC QN AUTO: 28 PG (ref 26.5–33)
MCHC RBC AUTO-ENTMCNC: 34.3 G/DL (ref 31.5–36.5)
MCV RBC AUTO: 82 FL (ref 78–100)
MONOCYTES # BLD AUTO: 1 10E9/L (ref 0–1.3)
MONOCYTES NFR BLD AUTO: 10.1 %
NEUTROPHILS # BLD AUTO: 5.9 10E9/L (ref 1.6–8.3)
NEUTROPHILS NFR BLD AUTO: 60.9 %
PLATELET # BLD AUTO: 284 10E9/L (ref 150–450)
POTASSIUM SERPL-SCNC: 3.8 MMOL/L (ref 3.4–5.3)
RBC # BLD AUTO: 5.29 10E12/L (ref 4.4–5.9)
SODIUM SERPL-SCNC: 140 MMOL/L (ref 133–144)
WBC # BLD AUTO: 9.8 10E9/L (ref 4–11)

## 2019-01-28 PROCEDURE — 85025 COMPLETE CBC W/AUTO DIFF WBC: CPT | Performed by: FAMILY MEDICINE

## 2019-01-28 PROCEDURE — 85610 PROTHROMBIN TIME: CPT | Performed by: FAMILY MEDICINE

## 2019-01-28 PROCEDURE — 36415 COLL VENOUS BLD VENIPUNCTURE: CPT | Performed by: FAMILY MEDICINE

## 2019-01-28 PROCEDURE — 99214 OFFICE O/P EST MOD 30 MIN: CPT | Performed by: FAMILY MEDICINE

## 2019-01-28 PROCEDURE — 80048 BASIC METABOLIC PNL TOTAL CA: CPT | Performed by: FAMILY MEDICINE

## 2019-01-28 ASSESSMENT — MIFFLIN-ST. JEOR: SCORE: 1598.9

## 2019-01-28 NOTE — PROGRESS NOTES
[facility]  [address] [address]  [phone]  Dept: [phone]    PRE-OP EVALUATION:  Today's date: 2019    Lonnie Hester (: 1975) presents for pre-operative evaluation assessment as requested by Dr. Abernathy.  He requires evaluation and anesthesia risk assessment prior to undergoing surgery/procedure for treatment of hernia .    Proposed Surgery/ Procedure:HERNIORRHAPHY INGUINAL, RIGHT WITH MESH   Date of Surgery/ Procedure: 2019  Time of Surgery/ Procedure: 8:00am  Hospital/Surgical Facility:  OR  Primary Physician: Luis Alberto Colin  Type of Anesthesia Anticipated: General    Patient has a Health Care Directive or Living Will:  NO    1. NO - Do you have a history of heart attack, stroke, stent, bypass or surgery on an artery in the head, neck, heart or legs?  2. NO - Do you ever have any pain or discomfort in your chest?  3. NO - Do you have a history of  Heart Failure?  4. NO - Are you troubled by shortness of breath when: walking on the level, up a slight hill or at night?  5. NO - Do you currently have a cold, bronchitis or other respiratory infection?  6. NO - Do you have a cough, shortness of breath or wheezing?  7. NO - Do you sometimes get pains in the calves of your legs when you walk?  8. NO - Do you or anyone in your family have previous history of blood clots?  9. NO - Do you or does anyone in your family have a serious bleeding problem such as prolonged bleeding following surgeries or cuts?  10. NO - Have you ever had problems with anemia or been told to take iron pills?  11. NO - Have you had any abnormal blood loss such as black, tarry or bloody stools, or abnormal vaginal bleeding?  12. NO - Have you ever had a blood transfusion?  13. NO - Have you or any of your relatives ever had problems with anesthesia?  14. YES - Do you have sleep apnea, excessive snoring or daytime drowsiness?  15. NO - Do you have any prosthetic heart  valves?  16. NO - Do you have prosthetic joints?  17. NO - Is there any chance that you may be pregnant?      HPI:     HPI related to upcoming procedure: 44 yo old male with right inguinal hernia scheduled for right inguinal repair with mesh.       See problem list for active medical problems.  Problems all longstanding and stable, except as noted/documented.  See ROS for pertinent symptoms related to these conditions.                                                                                                                                                          .    MEDICAL HISTORY:     Patient Active Problem List    Diagnosis Date Noted     Right inguinal hernia 01/22/2019     Priority: Medium     CARDIOVASCULAR SCREENING; LDL GOAL LESS THAN 130 06/10/2015     Priority: Medium     Insomnia 06/19/2006     Priority: Medium     June 19, 2006 - Light sleeper.  Lunesta ineffective and side effects. Trazodone.  Ambien if not working.  November 19, 2006 - Ambien CR trial.  Problem list name updated by automated process. Provider to review       Esophageal reflux 06/19/2006     Priority: Medium     June 19, 2006 - lifestyle changes,  Omeprazole.    2/4/08 [facility]. Probably gastroesophageal reflux disease, may have gastritis. Recommend Upper GI.       Impacted cerumen 06/19/2006     Priority: Medium     June 19, 2006 - Irrigated and manually removed on left.       LEFT HAND DISCOMFORT 06/19/2006     Priority: Medium     June 19, 2006 - Likely local pressure on nerves of hand with hyperextension/pressure.  Reassurance given.        Past Medical History:   Diagnosis Date     NO ACTIVE PROBLEMS      Past Surgical History:   Procedure Laterality Date     NO HISTORY OF SURGERY       Current Outpatient Medications   Medication Sig Dispense Refill     ascorbic acid (VITAMIN C) 1000 MG TABS Take 1,000 mg by mouth daily Reported on 4/14/2017       tiZANidine (ZANAFLEX) 4 MG tablet Take 1 tablet (4 mg) by mouth 3 times  "daily as needed for muscle spasms (Patient not taking: Reported on 4/14/2017) 90 tablet 1     OTC products: None, except as noted above    No Known Allergies   Latex Allergy: NO    Social History     Tobacco Use     Smoking status: Never Smoker     Smokeless tobacco: Never Used   Substance Use Topics     Alcohol use: Yes     Comment: 2 drinks per week     History   Drug Use No       REVIEW OF SYSTEMS:   CONSTITUTIONAL: NEGATIVE for fever, chills, change in weight  INTEGUMENTARY/SKIN: NEGATIVE for worrisome rashes, moles or lesions  EYES: NEGATIVE for vision changes or irritation  ENT/MOUTH: NEGATIVE for ear, mouth and throat problems  RESP: NEGATIVE for significant cough or SOB  BREAST: NEGATIVE for masses, tenderness or discharge  CV: NEGATIVE for chest pain, palpitations or peripheral edema  GI: NEGATIVE for nausea, abdominal pain, heartburn, or change in bowel habits  : NEGATIVE for frequency, dysuria, or hematuria  MUSCULOSKELETAL: NEGATIVE for significant arthralgias or myalgia  NEURO: NEGATIVE for weakness, dizziness or paresthesias  ENDOCRINE: NEGATIVE for temperature intolerance, skin/hair changes  HEME: NEGATIVE for bleeding problems  PSYCHIATRIC: NEGATIVE for changes in mood or affect    EXAM:   /76 (BP Location: Left arm, Cuff Size: Adult Regular)   Pulse 66   Temp 98.3  F (36.8  C) (Tympanic)   Ht 1.727 m (5' 8\")   Wt 71.9 kg (158 lb 9.6 oz)   SpO2 99%   BMI 24.12 kg/m      GENERAL APPEARANCE: healthy, alert and no distress     EYES: EOMI,  PERRL     HENT: ear canals and TM's normal and nose and mouth without ulcers or lesions     NECK: no adenopathy, no asymmetry, masses, or scars and thyroid normal to palpation     RESP: lungs clear to auscultation - no rales, rhonchi or wheezes     CV: regular rates and rhythm, normal S1 S2, no S3 or S4 and no murmur, click or rub     ABDOMEN:  soft, nontender, no HSM or masses and bowel sounds normal     MS: extremities normal- no gross deformities " noted, no evidence of inflammation in joints, FROM in all extremities.     SKIN: no suspicious lesions or rashes     NEURO: Normal strength and tone, sensory exam grossly normal, mentation intact and speech normal     PSYCH: mentation appears normal. and affect normal/bright     LYMPHATICS: No cervical adenopathy    DIAGNOSTICS:   Hemoglobin (indicated for history of anemia or procedure with significant blood loss such as tonsillectomy, major intraperitoneal surgery, vascular surgery, major spine surgery, total joint replacement)  Serum Potassium  Serum Creatinine  Coagulation Studies (e.g. INR, PTT, platelet count)    Recent Labs   Lab Test 12/16/14  1825   HGB 14.5           IMPRESSION:   Reason for surgery/procedure: Right inguinal hernia  Diagnosis/reason for consult: Right inguinal hernia    The proposed surgical procedure is considered INTERMEDIATE risk.    REVISED CARDIAC RISK INDEX  The patient has the following serious cardiovascular risks for perioperative complications such as (MI, PE, VFib and 3  AV Block):  No serious cardiac risks  INTERPRETATION: 0 risks: Class I (very low risk - 0.4% complication rate)    The patient has the following additional risks for perioperative complications:  No identified additional risks      ICD-10-CM    1. Preop general physical exam Z01.818    2. Right inguinal hernia K40.90        RECOMMENDATIONS:     --Consult hospital rounder / IM to assist post-op medical management    --Patient is to take all scheduled medications on the day of surgery EXCEPT for modifications listed below.    APPROVAL GIVEN to proceed with proposed procedure, without further diagnostic evaluation       Signed Electronically by: Luis Alberto Colin DO    Copy of this evaluation report is provided to requesting physician.    Tip Preop Guidelines    Revised Cardiac Risk Index

## 2019-01-28 NOTE — PATIENT INSTRUCTIONS
Epifanio Hester,    Thank you for allowing [facility] to manage your care.    I ordered some blood work, please go to the laboratory to get your laboratory studies.    Please allow 1-2 business days for our office to call you in regards to your laboratory/radiological studies.  If not done so, I encourage you to login into [email or web address] [email or web address] to review your results as well.     If you have any questions or concerns, please feel free to call us at [phone].    Sincerely,    Dr. Colin        Before Your Surgery      Call your surgeon if there is any change in your health. This includes signs of a cold or flu (such as a sore throat, runny nose, cough, rash or fever).    Do not smoke, drink alcohol or take over the counter medicine (unless your surgeon or primary care doctor tells you to) for the 24 hours before and after surgery.    If you take prescribed drugs: Follow your doctor s orders about which medicines to take and which to stop until after surgery.    Eating and drinking prior to surgery: follow the instructions from your surgeon    Take a shower or bath the night before surgery. Use the soap your surgeon gave you to gently clean your skin. If you do not have soap from your surgeon, use your regular soap. Do not shave or scrub the surgery site.  Wear clean pajamas and have clean sheets on your bed.

## 2019-02-12 ENCOUNTER — ANESTHESIA EVENT (OUTPATIENT)
Dept: SURGERY | Facility: AMBULATORY SURGERY CENTER | Age: 44
End: 2019-02-12

## 2019-02-13 ENCOUNTER — ANESTHESIA (OUTPATIENT)
Dept: SURGERY | Facility: AMBULATORY SURGERY CENTER | Age: 44
End: 2019-02-13
Payer: COMMERCIAL

## 2019-02-13 ENCOUNTER — HOSPITAL ENCOUNTER (OUTPATIENT)
Facility: AMBULATORY SURGERY CENTER | Age: 44
Discharge: HOME | End: 2019-02-13
Attending: SURGERY | Admitting: SURGERY
Payer: COMMERCIAL

## 2019-02-13 VITALS
SYSTOLIC BLOOD PRESSURE: 127 MMHG | HEART RATE: 77 BPM | RESPIRATION RATE: 16 BRPM | TEMPERATURE: 98.2 F | DIASTOLIC BLOOD PRESSURE: 72 MMHG | OXYGEN SATURATION: 98 %

## 2019-02-13 DIAGNOSIS — K40.90 RIGHT INGUINAL HERNIA: Primary | ICD-10-CM

## 2019-02-13 PROCEDURE — G8907 PT DOC NO EVENTS ON DISCHARG: HCPCS

## 2019-02-13 PROCEDURE — 49505 PRP I/HERN INIT REDUC >5 YR: CPT | Mod: RT | Performed by: SURGERY

## 2019-02-13 PROCEDURE — 49505 PRP I/HERN INIT REDUC >5 YR: CPT | Mod: RT

## 2019-02-13 PROCEDURE — G8918 PT W/O PREOP ORDER IV AB PRO: HCPCS

## 2019-02-13 DEVICE — MESH PROGRIP 4.7X3" PARIETEX RIGHT TEM1208GR: Type: IMPLANTABLE DEVICE | Site: INGUINAL | Status: FUNCTIONAL

## 2019-02-13 RX ORDER — MEPERIDINE HYDROCHLORIDE 25 MG/ML
12.5 INJECTION INTRAMUSCULAR; INTRAVENOUS; SUBCUTANEOUS
Status: DISCONTINUED | OUTPATIENT
Start: 2019-02-13 | End: 2019-02-14 | Stop reason: HOSPADM

## 2019-02-13 RX ORDER — FENTANYL CITRATE 50 UG/ML
25-50 INJECTION, SOLUTION INTRAMUSCULAR; INTRAVENOUS
Status: DISCONTINUED | OUTPATIENT
Start: 2019-02-13 | End: 2019-02-14 | Stop reason: HOSPADM

## 2019-02-13 RX ORDER — DEXAMETHASONE SODIUM PHOSPHATE 4 MG/ML
INJECTION, SOLUTION INTRA-ARTICULAR; INTRALESIONAL; INTRAMUSCULAR; INTRAVENOUS; SOFT TISSUE PRN
Status: DISCONTINUED | OUTPATIENT
Start: 2019-02-13 | End: 2019-02-13

## 2019-02-13 RX ORDER — FENTANYL CITRATE 50 UG/ML
INJECTION, SOLUTION INTRAMUSCULAR; INTRAVENOUS PRN
Status: DISCONTINUED | OUTPATIENT
Start: 2019-02-13 | End: 2019-02-13

## 2019-02-13 RX ORDER — LIDOCAINE 40 MG/G
CREAM TOPICAL
Status: DISCONTINUED | OUTPATIENT
Start: 2019-02-13 | End: 2019-02-14 | Stop reason: HOSPADM

## 2019-02-13 RX ORDER — PROPOFOL 10 MG/ML
INJECTION, EMULSION INTRAVENOUS PRN
Status: DISCONTINUED | OUTPATIENT
Start: 2019-02-13 | End: 2019-02-13

## 2019-02-13 RX ORDER — LIDOCAINE HYDROCHLORIDE 20 MG/ML
INJECTION, SOLUTION INFILTRATION; PERINEURAL PRN
Status: DISCONTINUED | OUTPATIENT
Start: 2019-02-13 | End: 2019-02-13

## 2019-02-13 RX ORDER — SODIUM CHLORIDE, SODIUM LACTATE, POTASSIUM CHLORIDE, CALCIUM CHLORIDE 600; 310; 30; 20 MG/100ML; MG/100ML; MG/100ML; MG/100ML
INJECTION, SOLUTION INTRAVENOUS CONTINUOUS
Status: DISCONTINUED | OUTPATIENT
Start: 2019-02-13 | End: 2019-02-14 | Stop reason: HOSPADM

## 2019-02-13 RX ORDER — HYDROMORPHONE HYDROCHLORIDE 1 MG/ML
.3-.5 INJECTION, SOLUTION INTRAMUSCULAR; INTRAVENOUS; SUBCUTANEOUS EVERY 10 MIN PRN
Status: DISCONTINUED | OUTPATIENT
Start: 2019-02-13 | End: 2019-02-14 | Stop reason: HOSPADM

## 2019-02-13 RX ORDER — OXYCODONE HYDROCHLORIDE 5 MG/1
10 TABLET ORAL EVERY 4 HOURS PRN
Status: DISCONTINUED | OUTPATIENT
Start: 2019-02-13 | End: 2019-02-14 | Stop reason: HOSPADM

## 2019-02-13 RX ORDER — ONDANSETRON 4 MG/1
4 TABLET, ORALLY DISINTEGRATING ORAL EVERY 30 MIN PRN
Status: DISCONTINUED | OUTPATIENT
Start: 2019-02-13 | End: 2019-02-14 | Stop reason: HOSPADM

## 2019-02-13 RX ORDER — EPHEDRINE SULFATE 50 MG/ML
INJECTION, SOLUTION INTRAMUSCULAR; INTRAVENOUS; SUBCUTANEOUS PRN
Status: DISCONTINUED | OUTPATIENT
Start: 2019-02-13 | End: 2019-02-13

## 2019-02-13 RX ORDER — DEXAMETHASONE SODIUM PHOSPHATE 4 MG/ML
4 INJECTION, SOLUTION INTRA-ARTICULAR; INTRALESIONAL; INTRAMUSCULAR; INTRAVENOUS; SOFT TISSUE EVERY 10 MIN PRN
Status: DISCONTINUED | OUTPATIENT
Start: 2019-02-13 | End: 2019-02-14 | Stop reason: HOSPADM

## 2019-02-13 RX ORDER — OXYCODONE AND ACETAMINOPHEN 5; 325 MG/1; MG/1
1 TABLET ORAL EVERY 6 HOURS PRN
Qty: 25 TABLET | Refills: 0 | Status: SHIPPED | OUTPATIENT
Start: 2019-02-13 | End: 2019-02-18

## 2019-02-13 RX ORDER — GABAPENTIN 300 MG/1
300 CAPSULE ORAL ONCE
Status: COMPLETED | OUTPATIENT
Start: 2019-02-13 | End: 2019-02-13

## 2019-02-13 RX ORDER — KETOROLAC TROMETHAMINE 30 MG/ML
INJECTION, SOLUTION INTRAMUSCULAR; INTRAVENOUS PRN
Status: DISCONTINUED | OUTPATIENT
Start: 2019-02-13 | End: 2019-02-13

## 2019-02-13 RX ORDER — BUPIVACAINE HYDROCHLORIDE AND EPINEPHRINE 2.5; 5 MG/ML; UG/ML
INJECTION, SOLUTION INFILTRATION; PERINEURAL PRN
Status: DISCONTINUED | OUTPATIENT
Start: 2019-02-13 | End: 2019-02-13 | Stop reason: HOSPADM

## 2019-02-13 RX ORDER — ONDANSETRON 2 MG/ML
4 INJECTION INTRAMUSCULAR; INTRAVENOUS EVERY 30 MIN PRN
Status: DISCONTINUED | OUTPATIENT
Start: 2019-02-13 | End: 2019-02-14 | Stop reason: HOSPADM

## 2019-02-13 RX ORDER — CEFAZOLIN SODIUM 2 G/100ML
2 INJECTION, SOLUTION INTRAVENOUS
Status: COMPLETED | OUTPATIENT
Start: 2019-02-13 | End: 2019-02-13

## 2019-02-13 RX ORDER — CEFAZOLIN SODIUM 1 G/3ML
1 INJECTION, POWDER, FOR SOLUTION INTRAMUSCULAR; INTRAVENOUS SEE ADMIN INSTRUCTIONS
Status: DISCONTINUED | OUTPATIENT
Start: 2019-02-13 | End: 2019-02-14 | Stop reason: HOSPADM

## 2019-02-13 RX ORDER — PHYSOSTIGMINE SALICYLATE 1 MG/ML
1.2 INJECTION INTRAVENOUS
Status: DISCONTINUED | OUTPATIENT
Start: 2019-02-13 | End: 2019-02-14 | Stop reason: HOSPADM

## 2019-02-13 RX ORDER — ONDANSETRON 2 MG/ML
INJECTION INTRAMUSCULAR; INTRAVENOUS PRN
Status: DISCONTINUED | OUTPATIENT
Start: 2019-02-13 | End: 2019-02-13

## 2019-02-13 RX ORDER — NALOXONE HYDROCHLORIDE 0.4 MG/ML
.1-.4 INJECTION, SOLUTION INTRAMUSCULAR; INTRAVENOUS; SUBCUTANEOUS
Status: DISCONTINUED | OUTPATIENT
Start: 2019-02-13 | End: 2019-02-14 | Stop reason: HOSPADM

## 2019-02-13 RX ORDER — ACETAMINOPHEN 325 MG/1
975 TABLET ORAL ONCE
Status: COMPLETED | OUTPATIENT
Start: 2019-02-13 | End: 2019-02-13

## 2019-02-13 RX ORDER — HYDRALAZINE HYDROCHLORIDE 20 MG/ML
2.5-5 INJECTION INTRAMUSCULAR; INTRAVENOUS EVERY 10 MIN PRN
Status: DISCONTINUED | OUTPATIENT
Start: 2019-02-13 | End: 2019-02-14 | Stop reason: HOSPADM

## 2019-02-13 RX ORDER — ALBUTEROL SULFATE 0.83 MG/ML
2.5 SOLUTION RESPIRATORY (INHALATION) EVERY 4 HOURS PRN
Status: DISCONTINUED | OUTPATIENT
Start: 2019-02-13 | End: 2019-02-14 | Stop reason: HOSPADM

## 2019-02-13 RX ORDER — METOPROLOL TARTRATE 1 MG/ML
1-2 INJECTION, SOLUTION INTRAVENOUS EVERY 5 MIN PRN
Status: DISCONTINUED | OUTPATIENT
Start: 2019-02-13 | End: 2019-02-14 | Stop reason: HOSPADM

## 2019-02-13 RX ADMIN — EPHEDRINE SULFATE 2.5 MG: 50 INJECTION, SOLUTION INTRAMUSCULAR; INTRAVENOUS; SUBCUTANEOUS at 08:23

## 2019-02-13 RX ADMIN — ACETAMINOPHEN 975 MG: 325 TABLET ORAL at 07:20

## 2019-02-13 RX ADMIN — ONDANSETRON 4 MG: 2 INJECTION INTRAMUSCULAR; INTRAVENOUS at 07:49

## 2019-02-13 RX ADMIN — EPHEDRINE SULFATE 2.5 MG: 50 INJECTION, SOLUTION INTRAMUSCULAR; INTRAVENOUS; SUBCUTANEOUS at 08:28

## 2019-02-13 RX ADMIN — Medication 30 MG: at 07:49

## 2019-02-13 RX ADMIN — EPHEDRINE SULFATE 5 MG: 50 INJECTION, SOLUTION INTRAMUSCULAR; INTRAVENOUS; SUBCUTANEOUS at 08:46

## 2019-02-13 RX ADMIN — EPHEDRINE SULFATE 5 MG: 50 INJECTION, SOLUTION INTRAMUSCULAR; INTRAVENOUS; SUBCUTANEOUS at 08:16

## 2019-02-13 RX ADMIN — EPHEDRINE SULFATE 5 MG: 50 INJECTION, SOLUTION INTRAMUSCULAR; INTRAVENOUS; SUBCUTANEOUS at 08:43

## 2019-02-13 RX ADMIN — Medication 10 MG: at 08:19

## 2019-02-13 RX ADMIN — DEXAMETHASONE SODIUM PHOSPHATE 4 MG: 4 INJECTION, SOLUTION INTRA-ARTICULAR; INTRALESIONAL; INTRAMUSCULAR; INTRAVENOUS; SOFT TISSUE at 07:49

## 2019-02-13 RX ADMIN — PROPOFOL 60 MG: 10 INJECTION, EMULSION INTRAVENOUS at 08:19

## 2019-02-13 RX ADMIN — PROPOFOL 200 MG: 10 INJECTION, EMULSION INTRAVENOUS at 07:49

## 2019-02-13 RX ADMIN — SODIUM CHLORIDE, SODIUM LACTATE, POTASSIUM CHLORIDE, CALCIUM CHLORIDE: 600; 310; 30; 20 INJECTION, SOLUTION INTRAVENOUS at 07:43

## 2019-02-13 RX ADMIN — LIDOCAINE HYDROCHLORIDE 5 ML: 20 INJECTION, SOLUTION INFILTRATION; PERINEURAL at 07:49

## 2019-02-13 RX ADMIN — EPHEDRINE SULFATE 2.5 MG: 50 INJECTION, SOLUTION INTRAMUSCULAR; INTRAVENOUS; SUBCUTANEOUS at 08:31

## 2019-02-13 RX ADMIN — CEFAZOLIN SODIUM 2 G: 2 INJECTION, SOLUTION INTRAVENOUS at 07:43

## 2019-02-13 RX ADMIN — FENTANYL CITRATE 50 MCG: 50 INJECTION, SOLUTION INTRAMUSCULAR; INTRAVENOUS at 07:43

## 2019-02-13 RX ADMIN — GABAPENTIN 300 MG: 300 CAPSULE ORAL at 07:20

## 2019-02-13 RX ADMIN — EPHEDRINE SULFATE 2.5 MG: 50 INJECTION, SOLUTION INTRAMUSCULAR; INTRAVENOUS; SUBCUTANEOUS at 08:33

## 2019-02-13 RX ADMIN — KETOROLAC TROMETHAMINE 30 MG: 30 INJECTION, SOLUTION INTRAMUSCULAR; INTRAVENOUS at 08:36

## 2019-02-13 RX ADMIN — FENTANYL CITRATE 50 MCG: 50 INJECTION, SOLUTION INTRAMUSCULAR; INTRAVENOUS at 07:49

## 2019-02-13 NOTE — ANESTHESIA CARE TRANSFER NOTE
Patient: Lonnie Hester    Procedure(s):  HERNIORRHAPHY INGUINAL, RIGHT WITH MESH    Diagnosis: Right inguinal hernia  Diagnosis Additional Information: No value filed.    Anesthesia Type:   No value filed.     Note:  Airway :Room Air  Patient transferred to:PACU  Handoff Report: Identifed the Patient, Identified the Reponsible Provider, Reviewed the pertinent medical history, Discussed the surgical course, Reviewed Intra-OP anesthesia mangement and issues during anesthesia, Set expectations for post-procedure period and Allowed opportunity for questions and acknowledgement of understanding      Vitals: (Last set prior to Anesthesia Care Transfer)    CRNA VITALS  2/13/2019 0820 - 2/13/2019 0855      2/13/2019             Pulse:  98    SpO2:  100 %    Resp Rate (observed):  9                Electronically Signed By: SAMARA Patrick CRNA  February 13, 2019  8:55 AM

## 2019-02-13 NOTE — OP NOTE
OPERATIVE REPORT    February 13, 2019  Preoperative diagnosis: right  Inguinal hernia.  Postoperative diagnosis:  same with a large direct hernia  Procedure:  Right  Inguinal hernia repair with right  ProGrip 12 by 8 cm  Anesthesia:  General anesthesia  Blood loss: Minimal  Surgeon : Fidencio Barajas M.D.  Indications:  Lonnie Hester is a 43 year old year old male with a right  inguinal hernia that is causing  discomfort.  It was felt that it should be repaired.  Risks and complications were explained to the patient including bleeding, risk of anenesthesia, infection, recurrance of hernia, damage to bowel, bladder and  vessels to the testicle.  That mesh is a better long term repair, but the down side is that if it gets infected will need to be removed.  Questions were answered and postoperative instructions were given to patient and any one with the patient.      Procedure:  The patient was brought to the OR, placed in supine position to general anesthesia, the right  groin was prepped in sterile manner. After a time out, an Incision was made in the usual fashion with a knife and then blunt and sharp and cautery was used to dissect down to the external oblique.  The external oblique was opened in the direction of its fibers at the external canal with cautery and then the rest was mainly blunt dissection.  The spermatic cord was carefully freed of surrounding attachments and placed in a Penrose drain.  All attachments with the cremasteric muscles were taken down with cautery.  There was an obvious hernia seen  A large direct hernia, with  the defect involving the floor, so I felt that once we had that completely freed up and placed back inside the abdomen, it was really a large area but not sticking out well and could see the floor was torn along this area.  I did close the defect, catching the internal oblique muscle and fascia with a continuous running 0 Vicryl suture, and catching as far down on the  lateral aspect of the external oblique fascia as possible to leave some room to close over the spermatic cord.         After that was done, I was able to place a finger between my closure and the spermatic cord without much difficulty.  The  12x8 cm ProGrip mesh was placed.  It was secured at the pubic tubercle with an 0-Vicryl suture at the marking on the mesh and then was laid on the floor, recreating the internal canal by bringing it around the spermatic cord.  It laid there very nicely.  I was able to place a finger between the spermatic cord and the mesh, but it looks like it is going to cover the area well.  After that was done and laid nicely, the area was irrigated with antibiotic irrigation with good clean return.  No evidence of any bleeding.  The spermatic cord was then placed on top of the mesh and the nerve which had been kept out of the way and seemed to be intact, was placed on just deep to the mesh also.  Then, the external oblique fascia was closed with a continuous running 0 Vicryl suture, taking great care not to involve the nerve and closing over the spermatic cord.  The Denise's fascia was brought together with several interrupted 3-0 Vicryl sutures, and skin was closed with continuous running 4-0 Monocryl, covered with tincture of Benzoin, Steri-Strips and a Tegaderm, along with an abdominal binder.  The patient did receive antibiotics preoperatively.       Plan is to discharge him home today.  Lift no more than 20 pounds for 3 to 6 weeks.  I will see him back in approximately 2 weeks.           MAXI THOMPSON MD

## 2019-02-13 NOTE — PROGRESS NOTES
No changes from previous exam  Procedure explained.  Questions answered  Plan right inguinal hernia repair open with mesh  Fidencio Barajas MD

## 2019-02-13 NOTE — BRIEF OP NOTE
[facility] Brief Operative Note    POST OPERATIVE NOTE-IMMEDIATE :  Date of Service: 2/13/2019    Preoperative Diagnosis:  Right inguinal hernia    Postoperative Diagnosis:  * No post-op diagnosis entered *    Procedures:  Right inguinal hernia repair with mesh    Prosthetic Devices: See Nurses note.    Surgeon(s) and Assistants (if any):    Surgeon(s):  Fidencio Barajas MD  Circulator: Krys Ko RN  Scrub Person: Brianda Carrera    Anesthesia:  General    Drains: None    Specimens: none      Tissue Removed, Not Sent:  No    Complications: none    Findings/Conclusions:  As above very weak floor so large direct    Estimated Blood Loss:  Less than 20 mL.    Condition on discharge from OR:  Satisfactory      Fidencio Barajas MD

## 2019-02-13 NOTE — ANESTHESIA POSTPROCEDURE EVALUATION
Anesthesia POST Procedure Evaluation    Patient: Lonnie Hester   MRN:     [id number] Gender:   male   Age:    43 year old :      1975        Preoperative Diagnosis: Right inguinal hernia   Procedure(s):  HERNIORRHAPHY INGUINAL, RIGHT WITH MESH   Postop Comments: No value filed.       Anesthesia Type:  General    Reportable Event: NO     PAIN: Uncomplicated   Sign Out status: Comfortable, Well controlled pain     PONV: No PONV   Sign Out status:  No Nausea or Vomiting     Neuro/Psych: Uneventful perioperative course   Sign Out Status: Preoperative baseline; Age appropriate mentation     Airway/Resp.: Uneventful perioperative course   Sign Out Status: Non labored breathing, age appropriate RR; Resp. Status within EXPECTED Parameters     CV: Uneventful perioperative course   Sign Out status: Appropriate BP and perfusion indices; Appropriate HR/Rhythm     Disposition:   Sign Out in:  PACU  Disposition:  Phase II; Home  Recovery Course: Uneventful  Follow-Up: Not required           Last Anesthesia Record Vitals:  CRNA VITALS  2019 0820 - 2019 0920      2019             Pulse:  98    SpO2:  100 %    Resp Rate (observed):  9          Last PACU/Preop Vitals:  Vitals:    19 0853 19 0902 19 0917   BP:  135/75 127/72   Pulse: 98 87 77   Resp: 16 16 16   Temp: 36.6  C (97.9  F)  36.8  C (98.2  F)   SpO2: 100% 97% 98%         Electronically Signed By: Thomas Irving MD, 2019, 2:36 PM

## 2019-02-13 NOTE — ANESTHESIA PREPROCEDURE EVALUATION
Anesthesia Pre-Procedure Evaluation    Patient: Lonnie Hester   MRN:     [id number] Gender:   male   Age:    43 year old :      1975        Preoperative Diagnosis: Right inguinal hernia   Procedure(s):  HERNIORRHAPHY INGUINAL, RIGHT WITH MESH     Past Medical History:   Diagnosis Date     NO ACTIVE PROBLEMS       Past Surgical History:   Procedure Laterality Date     NO HISTORY OF SURGERY            Anesthesia Evaluation     .             ROS/MED HX    ENT/Pulmonary:  - neg pulmonary ROS     Neurologic:  - neg neurologic ROS     Cardiovascular:  - neg cardiovascular ROS       METS/Exercise Tolerance:     Hematologic:  - neg hematologic  ROS       Musculoskeletal:  - neg musculoskeletal ROS       GI/Hepatic:  - neg GI/hepatic ROS   (+) GERD       Renal/Genitourinary:  - ROS Renal section negative       Endo:  - neg endo ROS       Psychiatric:  - neg psychiatric ROS       Infectious Disease:  - neg infectious disease ROS       Malignancy:      - no malignancy   Other:    - neg other ROS                     PHYSICAL EXAM:   Mental Status/Neuro: A/A/O   Airway: Facies: Feasible  Mallampati: I  Mouth/Opening: Full  TM distance: > 6 cm  Neck ROM: Full   Respiratory: Auscultation: CTAB     Resp. Rate: Normal     Resp. Effort: Normal      CV: Rhythm: Regular  Rate: Age appropriate  Heart: Normal Sounds   Comments:      Dental: Normal                  Lab Results   Component Value Date    WBC 9.8 2019    HGB 14.8 2019    HCT 43.2 2019     2019     2019    POTASSIUM 3.8 2019    CHLORIDE 102 2019    CO2 32 2019    BUN 19 2019    CR 0.96 2019    GLC 87 2019    NOAH 9.0 2019    INR 0.94 2019       Preop Vitals  BP Readings from Last 3 Encounters:   19 127/72   19 128/76   19 136/79    Pulse Readings from Last 3 Encounters:   19 77   19 66   19 68      Resp Readings from Last 3 Encounters:  "  02/13/19 16   05/07/13 16   05/06/13 16    SpO2 Readings from Last 3 Encounters:   02/13/19 98%   01/28/19 99%   01/15/19 99%      Temp Readings from Last 1 Encounters:   02/13/19 36.8  C (98.2  F) (Temporal)    Ht Readings from Last 1 Encounters:   01/28/19 1.727 m (5' 8\")      Wt Readings from Last 1 Encounters:   01/28/19 71.9 kg (158 lb 9.6 oz)    Estimated body mass index is 24.12 kg/m  as calculated from the following:    Height as of 1/28/19: 1.727 m (5' 8\").    Weight as of 1/28/19: 71.9 kg (158 lb 9.6 oz).     LDA:  Peripheral IV 02/13/19 Right Hand (Active)   Site Assessment WDL 2/13/2019  8:53 AM   Number of days: 0            Assessment:   ASA SCORE: 1    NPO Status: > 6 hours since completed Solid [employer]   Documentation: H&P complete; Preop Testing complete; Consents complete   Proceeding: Proceed without further delay  Tobacco Use:  NO Active use of Tobacco/UNKNOWN Tobacco use status     Plan:   Anes. Type:  General   Pre-Induction: Midazolam IV; Acetaminophen PO   Induction:  IV (Standard)   Airway: Oral ETT   Access/Monitoring: PIV   Maintenance: Balanced   Emergence: Procedure Site   Logistics: Same Day Surgery     Postop Pain/Sedation Strategy:  Standard-Options: Opioids PRN     PONV Management:  Adult Risk Factors:, Non-Smoker, Postop Opioids  Prevention: Ondansetron; Dexamethasone     CONSENT: Direct conversation   Plan and risks discussed with: Patient   Blood Products: Consent Deferred (Minimal Blood Loss)                         Thomas Irving MD  "

## 2019-02-13 NOTE — DISCHARGE INSTRUCTIONS
HERNIORRHAPHY DISCHARGE INSTRUCTIONS  DR. MAXI THOMPSON    1. You may resume your regular diet when you feel you are ready to. DO NOT drink alcoholic beverages for 24 hours or while you are taking prescription medication.    2. Limit your activities for the first 48 hours. Gradually, increase them as tolerated. You may use stairs. I encourage you to walk as tolerated. No lifting greater that 20 pounds for 3 weeks.    3. You will have some discomfort at the incision sites. This is expected. This should improve over the next 2-3 days. Ice and pain medication will help with this pain. Use prescribed pain medication as instructed.    4. Bruising and mild swelling is normal after surgery. For males it is common to have bruising going into the penis and scrotum. The area below and around the incision(s) will be hard and elevated. This is normal. I call it the healing ridge. This will resolve slowly over the next several months. If you feel the pain is increasing and cannot explain it by increasing activity please call us at [phone].    5. The dressing will often have some blood on it. You may shower 24 hours after surgery. Clean gently over incision site. If clear plastic covering or steri-strip comes off and there is still some bleeding or drainage then cover with gauze or band-aid. If no bleeding, there is no reason to cover site. The abdominal binder may be removed after 24 hours after surgery. You may continue to wear it however for comfort. I suggest  you wear an old t-shirt under the abdominal binder for a more comfortable wear.    6. Avoid Aspirin for the first 72 hours after the procedure. This medication may increase the tendency to bleed.    7. Use the following medications (in addition to your normal meds) as shown:  a. Percocet 5 mg 1-2 every 6 hours as needed for severe pain. This contains 325 mg of Tylenol (acetaminophen) per tablet.  Please do not take more than 4 grams of Tylenol (acetaminophen)  per day. For example, you may take 1 Percocet and 1 Tylenol, or 2 Percocet and no Tylenol, or 2 Tylenol and no Percocet every 6 hours.  b. Tylenol (acetaminophen) 500 mg every 6 hours as needed for mild pain. Do not take more than 1000 mg every 6 hours. (see above).  c. Motrin (ibuprofen) 200-800 mg every 6 hours as needed for mild to moderate pain. Take with food.     8. Notify Dr. Barajas's clinic at [phone] if:    Your discomfort is not relieved by your pain medication.    You have signs of infection such as temperature above 100.5 degrees orally, chills, or increasing daily discomfort.    Incision site is becoming more red and/or there is purulent drainage.    You have questions or concerns.    9. Please call [phone] to schedule a follow up appointment in about 2 weeks.    10. When taking narcotics (pain medication more than Tylenol [acetaminophen] and Motrin [ibuprofen]) it is important to keep your stools soft to avoid constipation and pain with straining. This is best done by drinking fluids (non-alcoholic and non-caffeinated) and taking a stool softener (i.e. Metamucil or milk of magnesia). You may be able to use non-narcotics for pain relief especially by the 3rd post- operative day. Tylenol (acetaminophen) 500 mg every 6 hours and/or Motrin (ibuprofen) 200-800 mg every 6 hours. Please do not take more than 4 grams of Tylenol (acetaminophen) per day. Remember your Percocet does have Tylenol (acetaminophen) already in it. Please take Motrin (ibuprofen) with food to help protect the stomach. If you have a history of stomach ulcers or stomach problems, do not take Motrin (ibuprofen).     11. Do not drive or operate heavy machinery for 24 hours after surgery or when taking narcotics. You may resume driving when feel that you can safely avoid an accident and are not taking narcotics. This is usually 5 to 7 days after surgery. You should not be alone for 24 hours after surgery.    12. Have milk of  magnesia available at home so that when you take the pain medications you take 1-2 teaspoons a day,  to help reduce problems with constipation.      [facility]  Same-Day Surgery   Adult Discharge Orders & Instructions   For 24 hours after surgery  1. Get plenty of rest.  A responsible adult must stay with you for at least 24 hours after you leave the hospital.   2. Do not drive or use heavy equipment.  If you have weakness or tingling, don't drive or use heavy equipment until this feeling goes away.  3. Do not drink alcohol.  4. Avoid strenuous or risky activities.  Ask for help when climbing stairs.   5. You may feel lightheaded.  IF so, sit for a few minutes before standing.  Have someone help you get up.   6. If you have nausea (feel sick to your stomach): Drink only clear liquids such as apple juice, ginger ale, broth or 7-Up.  Rest may also help.  Be sure to drink enough fluids.  Move to a regular diet as you feel able.  7. You may have a slight fever. Call the doctor if your fever is over 100 F (37.7 C) (taken under the tongue) or lasts longer than 24 hours.  8. You may have a dry mouth, a sore throat, muscle aches or trouble sleeping.  These should go away after 24 hours.  9. Do not make important or legal decisions.   Call your doctor for any of the followin.  Signs of infection (fever, growing tenderness at the surgery site, a large amount of drainage or bleeding, severe pain, foul-smelling drainage, redness, swelling).    2. It has been over 8 to 10 hours since surgery and you are still not able to urinate (pass water).    3.  Headache for over 24 hours.

## 2019-02-26 ENCOUNTER — OFFICE VISIT (OUTPATIENT)
Dept: SURGERY | Facility: CLINIC | Age: 44
End: 2019-02-26
Payer: COMMERCIAL

## 2019-02-26 VITALS
HEART RATE: 66 BPM | SYSTOLIC BLOOD PRESSURE: 134 MMHG | DIASTOLIC BLOOD PRESSURE: 77 MMHG | TEMPERATURE: 97.8 F | WEIGHT: 159 LBS | BODY MASS INDEX: 24.18 KG/M2 | OXYGEN SATURATION: 100 %

## 2019-02-26 DIAGNOSIS — Z98.890 S/P RIGHT INGUINAL HERNIORRHAPHY: Primary | ICD-10-CM

## 2019-02-26 DIAGNOSIS — Z87.19 S/P RIGHT INGUINAL HERNIORRHAPHY: Primary | ICD-10-CM

## 2019-02-26 PROCEDURE — 99024 POSTOP FOLLOW-UP VISIT: CPT | Performed by: SURGERY

## 2019-02-26 ASSESSMENT — PAIN SCALES - GENERAL: PAINLEVEL: NO PAIN (0)

## 2019-02-26 NOTE — LETTER
2/26/2019         RE: Lonnie Hester  [address] [place] [id number]        Dear Colleague,    Thank you for referring your patient, Lonnie Hester, to the [facility]. Please see a copy of my visit note below.    Lonnie is a 44 year old male who is status post Right  Inguinal hernia repair with right  ProGrip 12 by 8 cm with no chills and no fever.      Patient's  Pain is  improving.  Appetite is  improving.    Wound(s)  Is/are   clean dry and intact with no evidence of infection.    Questions were answered and discussion of no lifting more than 20 pounds for  6 weeks after surgery.    Feels there is a pressure feeling at the incision site that is more noticeable when walking.     Plan: follow up as needed.  Use antibiotic ointment on wound at night.   If patient can be cautious at work with lifting and not getting into positions that hurt his repair then can go back to work.       Time spent with the patient with greater that 50% of the time in discussion was 15 minutes.  Fidencio Barajas MD                      OPERATIVE REPORT     February 13, 2019  Preoperative diagnosis: right  Inguinal hernia.  Postoperative diagnosis:  same with a large direct hernia  Procedure:  Right  Inguinal hernia repair with right  ProGrip 12 by 8 cm  Anesthesia:  General anesthesia  Blood loss: Minimal  Surgeon : Fidencio Barajas M.D.  Indications:  Lonnie Hester is a 43 year old year old male with a right  inguinal hernia that is causing  discomfort.  It was felt that it should be repaired.  Risks and complications were explained to the patient including bleeding, risk of anenesthesia, infection, recurrance of hernia, damage to bowel, bladder and  vessels to the testicle.  That mesh is a better long term repair, but the down side is that if it gets infected will need to be removed.  Questions were answered and postoperative instructions were given to patient and any one with the patient.       Procedure:  The patient  was brought to the OR, placed in supine position to general anesthesia, the right  groin was prepped in sterile manner. After a time out, an Incision was made in the usual fashion with a knife and then blunt and sharp and cautery was used to dissect down to the external oblique.  The external oblique was opened in the direction of its fibers at the external canal with cautery and then the rest was mainly blunt dissection.  The spermatic cord was carefully freed of surrounding attachments and placed in a Penrose drain.  All attachments with the cremasteric muscles were taken down with cautery.  There was an obvious hernia seen  A large direct hernia, with  the defect involving the floor, so I felt that once we had that completely freed up and placed back inside the abdomen, it was really a large area but not sticking out well and could see the floor was torn along this area.  I did close the defect, catching the internal oblique muscle and fascia with a continuous running 0 Vicryl suture, and catching as far down on the lateral aspect of the external oblique fascia as possible to leave some room to close over the spermatic cord.         After that was done, I was able to place a finger between my closure and the spermatic cord without much difficulty.  The  12x8 cm ProGrip mesh was placed.  It was secured at the pubic tubercle with an 0-Vicryl suture at the marking on the mesh and then was laid on the floor, recreating the internal canal by bringing it around the spermatic cord.  It laid there very nicely.  I was able to place a finger between the spermatic cord and the mesh, but it looks like it is going to cover the area well.  After that was done and laid nicely, the area was irrigated with antibiotic irrigation with good clean return.  No evidence of any bleeding.  The spermatic cord was then placed on top of the mesh and the nerve which had been kept out of the way and seemed to be intact, was placed on just  deep to the mesh also.  Then, the external oblique fascia was closed with a continuous running 0 Vicryl suture, taking great care not to involve the nerve and closing over the spermatic cord.  The Denise's fascia was brought together with several interrupted 3-0 Vicryl sutures, and skin was closed with continuous running 4-0 Monocryl, covered with tincture of Benzoin, Steri-Strips and a Tegaderm, along with an abdominal binder.  The patient did receive antibiotics preoperatively.       Plan is to discharge him home today.  Lift no more than 20 pounds for 3 to 6 weeks.  I will see him back in approximately 2 weeks.           FIDENCIO BARAJAS MD          Again, thank you for allowing me to participate in the care of your patient.        Sincerely,        Fidencio Barajas MD

## 2019-02-26 NOTE — PATIENT INSTRUCTIONS
Lonnie is a 44 year old male who is status post Right  Inguinal hernia repair with right  ProGrip 12 by 8 cm with no chills and no fever.      Patient's  Pain is  improving.  Appetite is  improving.    Wound(s)  Is/are   clean dry and intact with no evidence of infection.    Questions were answered and discussion of no lifting more than 20 pounds for  6 weeks after surgery.  If patient can be cautious at work with lifting and not getting into positions that hurt his repair then can go back to work.  Feels there is a pressure feeling at the incision site that is more noticeable when walking.     Plan: follow up as needed.  Use antibiotic ointment on wound at night.   Fidencio Barajas MD

## 2019-02-26 NOTE — PROGRESS NOTES
Lonnie is a 44 year old male who is status post Right  Inguinal hernia repair with right  ProGrip 12 by 8 cm with no chills and no fever.      Patient's  Pain is  improving.  Appetite is  improving.    Wound(s)  Is/are   clean dry and intact with no evidence of infection.    Questions were answered and discussion of no lifting more than 20 pounds for  6 weeks after surgery.    Feels there is a pressure feeling at the incision site that is more noticeable when walking.     Plan: follow up as needed.  Use antibiotic ointment on wound at night.   If patient can be cautious at work with lifting and not getting into positions that hurt his repair then can go back to work.       Time spent with the patient with greater that 50% of the time in discussion was 15 minutes.  Fidencio Barajas MD                      OPERATIVE REPORT     February 13, 2019  Preoperative diagnosis: right  Inguinal hernia.  Postoperative diagnosis:  same with a large direct hernia  Procedure:  Right  Inguinal hernia repair with right  ProGrip 12 by 8 cm  Anesthesia:  General anesthesia  Blood loss: Minimal  Surgeon : Fidencio Barajas M.D.  Indications:  Lonnie Hester is a 43 year old year old male with a right  inguinal hernia that is causing  discomfort.  It was felt that it should be repaired.  Risks and complications were explained to the patient including bleeding, risk of anenesthesia, infection, recurrance of hernia, damage to bowel, bladder and  vessels to the testicle.  That mesh is a better long term repair, but the down side is that if it gets infected will need to be removed.  Questions were answered and postoperative instructions were given to patient and any one with the patient.       Procedure:  The patient was brought to the OR, placed in supine position to general anesthesia, the right  groin was prepped in sterile manner. After a time out, an Incision was made in the usual fashion with a knife and then blunt and sharp and cautery was  used to dissect down to the external oblique.  The external oblique was opened in the direction of its fibers at the external canal with cautery and then the rest was mainly blunt dissection.  The spermatic cord was carefully freed of surrounding attachments and placed in a Penrose drain.  All attachments with the cremasteric muscles were taken down with cautery.  There was an obvious hernia seen  A large direct hernia, with  the defect involving the floor, so I felt that once we had that completely freed up and placed back inside the abdomen, it was really a large area but not sticking out well and could see the floor was torn along this area.  I did close the defect, catching the internal oblique muscle and fascia with a continuous running 0 Vicryl suture, and catching as far down on the lateral aspect of the external oblique fascia as possible to leave some room to close over the spermatic cord.         After that was done, I was able to place a finger between my closure and the spermatic cord without much difficulty.  The  12x8 cm ProGrip mesh was placed.  It was secured at the pubic tubercle with an 0-Vicryl suture at the marking on the mesh and then was laid on the floor, recreating the internal canal by bringing it around the spermatic cord.  It laid there very nicely.  I was able to place a finger between the spermatic cord and the mesh, but it looks like it is going to cover the area well.  After that was done and laid nicely, the area was irrigated with antibiotic irrigation with good clean return.  No evidence of any bleeding.  The spermatic cord was then placed on top of the mesh and the nerve which had been kept out of the way and seemed to be intact, was placed on just deep to the mesh also.  Then, the external oblique fascia was closed with a continuous running 0 Vicryl suture, taking great care not to involve the nerve and closing over the spermatic cord.  The Denise's fascia was brought together  with several interrupted 3-0 Vicryl sutures, and skin was closed with continuous running 4-0 Monocryl, covered with tincture of Benzoin, Steri-Strips and a Tegaderm, along with an abdominal binder.  The patient did receive antibiotics preoperatively.       Plan is to discharge him home today.  Lift no more than 20 pounds for 3 to 6 weeks.  I will see him back in approximately 2 weeks.           MAXI THOMPSON MD

## 2019-12-02 ENCOUNTER — OFFICE VISIT (OUTPATIENT)
Dept: FAMILY MEDICINE | Facility: CLINIC | Age: 44
End: 2019-12-02
Payer: COMMERCIAL

## 2019-12-02 VITALS
DIASTOLIC BLOOD PRESSURE: 79 MMHG | SYSTOLIC BLOOD PRESSURE: 131 MMHG | TEMPERATURE: 98.3 F | WEIGHT: 159.4 LBS | BODY MASS INDEX: 24.16 KG/M2 | HEART RATE: 64 BPM | HEIGHT: 68 IN

## 2019-12-02 DIAGNOSIS — R13.10 DYSPHAGIA, UNSPECIFIED TYPE: ICD-10-CM

## 2019-12-02 DIAGNOSIS — Z23 NEED FOR VACCINATION: ICD-10-CM

## 2019-12-02 DIAGNOSIS — K21.00 GASTROESOPHAGEAL REFLUX DISEASE WITH ESOPHAGITIS: ICD-10-CM

## 2019-12-02 DIAGNOSIS — Z00.00 ROUTINE GENERAL MEDICAL EXAMINATION AT A HEALTH CARE FACILITY: Primary | ICD-10-CM

## 2019-12-02 DIAGNOSIS — M77.01 MEDIAL EPICONDYLITIS OF ELBOW, RIGHT: ICD-10-CM

## 2019-12-02 DIAGNOSIS — Z13.220 LIPID SCREENING: ICD-10-CM

## 2019-12-02 DIAGNOSIS — Z13.1 SCREENING FOR DIABETES MELLITUS: ICD-10-CM

## 2019-12-02 PROCEDURE — 99213 OFFICE O/P EST LOW 20 MIN: CPT | Mod: 25 | Performed by: FAMILY MEDICINE

## 2019-12-02 PROCEDURE — 90686 IIV4 VACC NO PRSV 0.5 ML IM: CPT | Performed by: FAMILY MEDICINE

## 2019-12-02 PROCEDURE — 99396 PREV VISIT EST AGE 40-64: CPT | Mod: 25 | Performed by: FAMILY MEDICINE

## 2019-12-02 PROCEDURE — 90715 TDAP VACCINE 7 YRS/> IM: CPT | Performed by: FAMILY MEDICINE

## 2019-12-02 PROCEDURE — 90472 IMMUNIZATION ADMIN EACH ADD: CPT | Performed by: FAMILY MEDICINE

## 2019-12-02 PROCEDURE — 90471 IMMUNIZATION ADMIN: CPT | Performed by: FAMILY MEDICINE

## 2019-12-02 RX ORDER — OMEPRAZOLE 40 MG/1
40 CAPSULE, DELAYED RELEASE ORAL DAILY
Qty: 90 CAPSULE | Refills: 3 | Status: SHIPPED | OUTPATIENT
Start: 2019-12-02 | End: 2021-02-23

## 2019-12-02 ASSESSMENT — ENCOUNTER SYMPTOMS
SORE THROAT: 0
AGITATION: 0
FREQUENCY: 0
DYSURIA: 0
NECK STIFFNESS: 0
TROUBLE SWALLOWING: 1
APNEA: 0
FEVER: 0
NECK PAIN: 0
COLOR CHANGE: 0
PHOTOPHOBIA: 0
EYE ITCHING: 0
DIARRHEA: 0
FATIGUE: 0
COUGH: 0
SINUS PAIN: 0
EYE DISCHARGE: 0
EYE PAIN: 0
CONFUSION: 0
SINUS PRESSURE: 0
DIFFICULTY URINATING: 0
BACK PAIN: 0
NAUSEA: 0
PALPITATIONS: 0
CONSTIPATION: 0
NERVOUS/ANXIOUS: 0
SLEEP DISTURBANCE: 0
VOMITING: 0
SHORTNESS OF BREATH: 0
ABDOMINAL PAIN: 0
DIZZINESS: 0
WEAKNESS: 0
DECREASED CONCENTRATION: 0
WHEEZING: 0
ACTIVITY CHANGE: 0
UNEXPECTED WEIGHT CHANGE: 0
NUMBNESS: 0
APPETITE CHANGE: 0
HEADACHES: 0
CHILLS: 0

## 2019-12-02 ASSESSMENT — MIFFLIN-ST. JEOR: SCORE: 1594.91

## 2019-12-02 NOTE — PROGRESS NOTES
3  SUBJECTIVE:   CC: Lonnie Hester is an 44 year old male who presents for preventive health visit.     Healthy Habits:    Do you get at least three servings of calcium containing foods daily (dairy, green leafy vegetables, etc.)? yes    Amount of exercise or daily activities, outside of work: Active at work, on his feet all day    Problems taking medications regularly not applicable    Medication side effects: No    Have you had an eye exam in the past two years? no    Do you see a dentist twice per year? yes    Do you have sleep apnea, excessive snoring or daytime drowsiness?yes, excessive snoring, will occasionally wake in the middle of the night coughing      *  Right elbow pain, started over the last 6 months but progressively getting worse  *  Pain in his ribs off and on over the last month  *  Will do Flu shot and TDAP today    Today's PHQ-2 Score:   PHQ-2 ( 1999 [employer]) 12/2/2019 4/14/2017   Q1: Little interest or pleasure in doing things 0 0   Q2: Feeling down, depressed or hopeless 0 0   PHQ-2 Score 0 0       Abuse: Current or Past(Physical, Sexual or Emotional)- No  Do you feel safe in your environment? Yes        Social History     Tobacco Use     Smoking status: Never Smoker     Smokeless tobacco: Never Used   Substance Use Topics     Alcohol use: Yes     Comment: 2 drinks per week     If you drink alcohol do you typically have >3 drinks per day or >7 drinks per week? No                      Last PSA: No results found for: PSA    Reviewed and updated as needed this visit by clinical staff  Tobacco  Allergies  Meds  Med Hx  Surg Hx  Fam Hx  Soc Hx        Reviewed and updated as needed this visit by Provider        1. Physical exam    2. Right elbow pain: History of repetetive motions with plyers and lifting ladder.  Works as .  Ongoing for the past 6 months and getting worse.  Pain is on the inside of middle elbow.     3. Pressure in abdomen: States of difficulty with swallowing.   "Occurs once a month.  Had to vomiting.  Started four months ago.  States of heartburn symptoms.  Unsure if it is related to certain foods.     Review of Systems   Constitutional: Negative for activity change, appetite change, chills, fatigue, fever and unexpected weight change.   HENT: Positive for trouble swallowing. Negative for congestion, ear pain, sinus pressure, sinus pain and sore throat.    Eyes: Negative for photophobia, pain, discharge, itching and visual disturbance.   Respiratory: Negative for apnea, cough, shortness of breath and wheezing.    Cardiovascular: Negative for chest pain, palpitations and leg swelling.   Gastrointestinal: Negative for abdominal pain, constipation, diarrhea, nausea and vomiting.   Genitourinary: Negative for difficulty urinating, dysuria, frequency and urgency.   Musculoskeletal: Negative for back pain, neck pain and neck stiffness.        Right elbow pain   Skin: Negative for color change and rash.   Neurological: Negative for dizziness, syncope, weakness, numbness and headaches.   Psychiatric/Behavioral: Negative for agitation, behavioral problems, confusion, decreased concentration and sleep disturbance. The patient is not nervous/anxious.          OBJECTIVE:   /79   Pulse 64   Temp 98.3  F (36.8  C) (Tympanic)   Ht 1.723 m (5' 7.84\")   Wt 72.3 kg (159 lb 6.4 oz)   BMI 24.35 kg/m    EXAM:  Physical Exam  Constitutional:       General: He is not in acute distress.  HENT:      Head: Normocephalic and atraumatic.      Right Ear: External ear normal.      Left Ear: External ear normal.      Nose: Nose normal.      Mouth/Throat:      Pharynx: No oropharyngeal exudate.   Eyes:      General:         Right eye: No discharge.         Left eye: No discharge.      Conjunctiva/sclera: Conjunctivae normal.      Pupils: Pupils are equal, round, and reactive to light.   Neck:      Musculoskeletal: Normal range of motion and neck supple.      Thyroid: No thyromegaly.      " Trachea: No tracheal deviation.   Cardiovascular:      Rate and Rhythm: Normal rate and regular rhythm.      Heart sounds: Normal heart sounds. No murmur.   Pulmonary:      Effort: No respiratory distress.      Breath sounds: Normal breath sounds. No wheezing.   Chest:      Chest wall: No tenderness.   Abdominal:      General: There is no distension.      Palpations: Abdomen is soft. There is no mass.      Tenderness: There is no abdominal tenderness. There is no guarding.   Musculoskeletal: Normal range of motion.      Comments: Right elbow: tenderness involving medial epicondyle especially supination against resistance   Lymphadenopathy:      Cervical: No cervical adenopathy.   Skin:     General: Skin is warm.      Findings: No erythema or rash.   Neurological:      Mental Status: He is alert and oriented to person, place, and time.      Cranial Nerves: No cranial nerve deficit.      Coordination: Coordination normal.       ASSESSMENT/PLAN:   1. Routine general medical examination at a health care facility    2. Gastroesophageal reflux disease with esophagitis  - omeprazole (PRILOSEC) 40 MG DR capsule; Take 1 capsule (40 mg) by mouth daily  Dispense: 90 capsule; Refill: 3    3. Dysphagia, unspecified type  - XR Video Swallow w Esophagram w/ SLP or OT - Order with Speech Therapy Referral; Future    4. Need for vaccination  - INFLUENZA VACCINE IM > 6 MONTHS VALENT IIV4 [40951]  - TDAP VACCINE (ADACEL)  -      ADMIN VACCINE, FIRST    5. Lipid screening  - Lipid panel reflex to direct LDL Fasting; Future    6. Screening for diabetes mellitus  - Glucose; Future    7. Medial epicondylitis of elbow, right  Trial of elbow sleeve and ibuprofen.  If no improvement, consider steroid injection.     COUNSELING:  Reviewed preventive health counseling, as reflected in patient instructions       Regular exercise       Healthy diet/nutrition    Estimated body mass index is 24.35 kg/m  as calculated from the following:    Height as  "of this encounter: 1.723 m (5' 7.84\").    Weight as of this encounter: 72.3 kg (159 lb 6.4 oz).         reports that he has never smoked. He has never used smokeless tobacco.      Counseling Resources:  ATP IV Guidelines  Pooled Cohorts Equation Calculator  FRAX Risk Assessment  ICSI Preventive Guidelines  Dietary Guidelines for Americans, 2010  USDA's MyPlate  ASA Prophylaxis  Lung CA Screening    Luis Alberto Colin DO  [facility]  "

## 2019-12-02 NOTE — PATIENT INSTRUCTIONS
Epifanio Fleming,    Thank you for allowing [facility] to manage your care.    I ordered some fasting blood work, please go to the laboratory to get your laboratory studies.  Please call [phone] to scheduled your laboratory appointment.     I sent your prescriptions to your pharmacy.    I ordered a swallow study, please call diagnostic imaging [phone] to schedule your test.    Please allow 1-2 business days for our office to call you in regards to your laboratory/radiological studies.  If not done so, I encourage you to login into [facility] [email or web address] to review your results as well.     May take ibuprofen 600 mg twice a day as needed for your elbow pain (over the counter).  Encourage over the counter elbow sleeve.     If you have any questions or concerns, please feel free to call us at [phone].    Meg Flores to you and your family!    Sincerely,    Dr. Colin    Did you know?  You can schedule an e-Visit for certain simple non-emergent issue for your convenience.  To learn more about or start an eVisit, simply login to [facility], click  Visits  on top banner, click  Start a Virtual Visit  drop down, and click  Symptom-Specific E-Visit       Preventive Health Recommendations  Male Ages 40 to 49    Yearly exam:             See your health care provider every year in order to  o   Review health changes.   o   Discuss preventive care.    o   Review your medicines if your doctor has prescribed any.    You should be tested each year for STDs (sexually transmitted diseases) if you re at risk.     Have a cholesterol test every 5 years.     Have a colonoscopy (test for colon cancer) if someone in your family has had colon cancer or polyps before age 50.     After age 45, have a diabetes test (fasting glucose). If you are at risk for diabetes, you should have this test every 3 years.      Talk with your health care provider about whether or not a prostate cancer screening test  (PSA) is right for you.    Shots: Get a flu shot each year. Get a tetanus shot every 10 years.     Nutrition:    Eat at least 5 servings of fruits and vegetables daily.     Eat whole-grain bread, whole-wheat pasta and brown rice instead of white grains and rice.     Get adequate Calcium and Vitamin D.     Lifestyle    Exercise for at least 150 minutes a week (30 minutes a day, 5 days a week). This will help you control your weight and prevent disease.     Limit alcohol to one drink per day.     No smoking.     Wear sunscreen to prevent skin cancer.     See your dentist every six months for an exam and cleaning.

## 2019-12-04 DIAGNOSIS — Z13.220 LIPID SCREENING: ICD-10-CM

## 2019-12-04 DIAGNOSIS — Z13.1 SCREENING FOR DIABETES MELLITUS: ICD-10-CM

## 2019-12-04 LAB
CHOLEST SERPL-MCNC: 204 MG/DL
GLUCOSE SERPL-MCNC: 84 MG/DL (ref 70–99)
HDLC SERPL-MCNC: 72 MG/DL
LDLC SERPL CALC-MCNC: 122 MG/DL
NONHDLC SERPL-MCNC: 132 MG/DL
TRIGL SERPL-MCNC: 52 MG/DL

## 2019-12-04 PROCEDURE — 36415 COLL VENOUS BLD VENIPUNCTURE: CPT | Performed by: FAMILY MEDICINE

## 2019-12-04 PROCEDURE — 82947 ASSAY GLUCOSE BLOOD QUANT: CPT | Performed by: FAMILY MEDICINE

## 2019-12-04 PROCEDURE — 80061 LIPID PANEL: CPT | Performed by: FAMILY MEDICINE

## 2020-04-10 ENCOUNTER — APPOINTMENT (OUTPATIENT)
Dept: GENERAL RADIOLOGY | Facility: CLINIC | Age: 45
End: 2020-04-10
Attending: FAMILY MEDICINE
Payer: OTHER MISCELLANEOUS

## 2020-04-10 ENCOUNTER — HOSPITAL ENCOUNTER (EMERGENCY)
Facility: CLINIC | Age: 45
Discharge: HOME | End: 2020-04-10
Attending: FAMILY MEDICINE | Admitting: FAMILY MEDICINE
Payer: OTHER MISCELLANEOUS

## 2020-04-10 ENCOUNTER — NURSE TRIAGE (OUTPATIENT)
Dept: NURSING | Facility: CLINIC | Age: 45
End: 2020-04-10

## 2020-04-10 VITALS
BODY MASS INDEX: 23.68 KG/M2 | RESPIRATION RATE: 18 BRPM | HEART RATE: 57 BPM | SYSTOLIC BLOOD PRESSURE: 155 MMHG | OXYGEN SATURATION: 97 % | DIASTOLIC BLOOD PRESSURE: 88 MMHG | TEMPERATURE: 98.3 F | WEIGHT: 155 LBS

## 2020-04-10 DIAGNOSIS — I99.8 ISCHEMIA OF FINGER: ICD-10-CM

## 2020-04-10 LAB
ANION GAP SERPL CALCULATED.3IONS-SCNC: 3 MMOL/L (ref 3–14)
BASOPHILS # BLD AUTO: 0.1 10E9/L (ref 0–0.2)
BASOPHILS NFR BLD AUTO: 0.7 %
BUN SERPL-MCNC: 22 MG/DL (ref 7–30)
CALCIUM SERPL-MCNC: 9.6 MG/DL (ref 8.5–10.1)
CHLORIDE SERPL-SCNC: 107 MMOL/L (ref 94–109)
CO2 SERPL-SCNC: 30 MMOL/L (ref 20–32)
CREAT SERPL-MCNC: 0.98 MG/DL (ref 0.66–1.25)
DIFFERENTIAL METHOD BLD: NORMAL
EOSINOPHIL # BLD AUTO: 0.3 10E9/L (ref 0–0.7)
EOSINOPHIL NFR BLD AUTO: 4.5 %
ERYTHROCYTE [DISTWIDTH] IN BLOOD BY AUTOMATED COUNT: 11.9 % (ref 10–15)
GFR SERPL CREATININE-BSD FRML MDRD: >90 ML/MIN/{1.73_M2}
GLUCOSE SERPL-MCNC: 94 MG/DL (ref 70–99)
HCT VFR BLD AUTO: 47.9 % (ref 40–53)
HGB BLD-MCNC: 16.5 G/DL (ref 13.3–17.7)
IMM GRANULOCYTES # BLD: 0 10E9/L (ref 0–0.4)
IMM GRANULOCYTES NFR BLD: 0.1 %
LYMPHOCYTES # BLD AUTO: 1.7 10E9/L (ref 0.8–5.3)
LYMPHOCYTES NFR BLD AUTO: 25.2 %
MCH RBC QN AUTO: 28.4 PG (ref 26.5–33)
MCHC RBC AUTO-ENTMCNC: 34.4 G/DL (ref 31.5–36.5)
MCV RBC AUTO: 82 FL (ref 78–100)
MONOCYTES # BLD AUTO: 0.5 10E9/L (ref 0–1.3)
MONOCYTES NFR BLD AUTO: 7.6 %
NEUTROPHILS # BLD AUTO: 4.3 10E9/L (ref 1.6–8.3)
NEUTROPHILS NFR BLD AUTO: 61.9 %
NRBC # BLD AUTO: 0 10*3/UL
NRBC BLD AUTO-RTO: 0 /100
PLATELET # BLD AUTO: 300 10E9/L (ref 150–450)
POTASSIUM SERPL-SCNC: 4 MMOL/L (ref 3.4–5.3)
RBC # BLD AUTO: 5.81 10E12/L (ref 4.4–5.9)
SODIUM SERPL-SCNC: 140 MMOL/L (ref 133–144)
WBC # BLD AUTO: 6.9 10E9/L (ref 4–11)

## 2020-04-10 PROCEDURE — 85025 COMPLETE CBC W/AUTO DIFF WBC: CPT | Performed by: FAMILY MEDICINE

## 2020-04-10 PROCEDURE — 93005 ELECTROCARDIOGRAM TRACING: CPT | Performed by: FAMILY MEDICINE

## 2020-04-10 PROCEDURE — 80048 BASIC METABOLIC PNL TOTAL CA: CPT | Performed by: FAMILY MEDICINE

## 2020-04-10 PROCEDURE — 99285 EMERGENCY DEPT VISIT HI MDM: CPT | Mod: 25 | Performed by: FAMILY MEDICINE

## 2020-04-10 PROCEDURE — 93010 ELECTROCARDIOGRAM REPORT: CPT | Mod: Z6 | Performed by: FAMILY MEDICINE

## 2020-04-10 PROCEDURE — 73140 X-RAY EXAM OF FINGER(S): CPT | Mod: RT

## 2020-04-10 ASSESSMENT — ENCOUNTER SYMPTOMS
DIAPHORESIS: 0
CONSTIPATION: 0
SORE THROAT: 0
ABDOMINAL PAIN: 0
SINUS PRESSURE: 0
HEADACHES: 0
FREQUENCY: 0
NAUSEA: 0
VOMITING: 0
BLOOD IN STOOL: 0
CHILLS: 0
DIARRHEA: 0
COUGH: 0
FEVER: 0
WHEEZING: 0
DYSURIA: 0
PALPITATIONS: 0
SHORTNESS OF BREATH: 0

## 2020-04-10 NOTE — TELEPHONE ENCOUNTER
Middle finger on the right hand turned black.   States the first knuckle and above is black.   Denies recent injury.   States hand started to feel cold, was wearing gloves and finger started feeling black.   +Swelling & numbness.  Tip of finger is white, states there is no circulation in the tip of the finger.  Finger feels really cold compared to the other fingers.   Does not feel like he has a fever.     RN recommended going into the ED for evaluation due to black coloring with the finger.     Pt will go to ED now.     Mya Pryor RN   [facility] Triage       Reason for Disposition    Patient sounds very sick or weak to the triager     Finger is black, cold to the touch, numb    Additional Information    Negative: [1] Swollen joint AND [2] fever    Negative: Followed a finger injury    Negative: Wound looks infected    Negative: Caused by an animal bite    Negative: Caused by frostbite    Negative: Caused by a human bite    Negative: Hand or wrist pain is main symptom    Negative: [1] Looks infected (spreading redness, red streak, pus) AND [2] fever    Negative: [1] Looks infected (spreading redness, red streak, pus) AND [2] severe pain with movement    Protocols used: FINGER PAIN-A-AH

## 2020-04-10 NOTE — DISCHARGE INSTRUCTIONS
ICD-10-CM    1. Ischemia of finger??  I99.8 Orthopedic & Spine  Referral     US Arterial upper extremity rt    Unclear cause or if this was ischemia vs trauma.  There is a risk of palmar arch aneurysm due to hypothenar hammer syndrome as discussed with vascular.  unable to ultrasound this here, but consult written for ultrasound.  follow-up ortho-hand.  return for recurrent signs of decreased blood supply of finfger. avoid overuse. note for work

## 2020-04-10 NOTE — ED PROVIDER NOTES
History     Chief Complaint   Patient presents with     Numbness     right middle finger numbness with bruise noted.  no injury  started 45 minutes ago  NO headache or dizziness. No numbness to extremiteis      HPI  Lonnie Hester is a 45 year old RT handed male who presents with isolated middle finger darkening ?cyanosis of the middle phalanx region and the distal phalanx volar aspect was pale.  onset 0900 and improved after 1030. sense of numbness.  No prior similar episodes.  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  had glove on.  No atrial fibrillation history.  history of recent medial epicondylitis rt in dec.  no tobacco.  No VTE risk. No IVDA,    Denies recent prolonged travel (>3 hours) by car or plane, history or FHx of venous thromboembolism, recent surgery (last 4 weeks), active cancer history, hypercoagulable state, estrogen or other medications/conditions causing VTE or  new unilateral swelling or pain in the legs or calves.    headaches - chronic - frontal forehead.      Allergies:  No Known Allergies    Problem List:    Patient Active Problem List    Diagnosis Date Noted     Right inguinal hernia 01/22/2019     Priority: Medium     CARDIOVASCULAR SCREENING; LDL GOAL LESS THAN 130 06/10/2015     Priority: Medium     Insomnia 06/19/2006     Priority: Medium     June 19, 2006 - Light sleeper.  Lunesta ineffective and side effects. Trazodone.  Ambien if not working.  November 19, 2006 - Ambien CR trial.  Problem list name updated by automated process. Provider to review       Esophageal reflux 06/19/2006     Priority: Medium     June 19, 2006 - lifestyle changes,  Omeprazole.    2/4/08 [facility]. Probably gastroesophageal reflux disease, may have gastritis. Recommend Upper GI.       Impacted cerumen 06/19/2006     Priority: Medium     June 19, 2006 - Irrigated and manually removed on left.       LEFT HAND DISCOMFORT 06/19/2006     Priority: Medium     June 19, 2006 - Likely local pressure on nerves of hand with  hyperextension/pressure.  Reassurance given.          Past Medical History:    Past Medical History:   Diagnosis Date     NO ACTIVE PROBLEMS        Past Surgical History:    Past Surgical History:   Procedure Laterality Date     HERNIORRHAPHY INGUINAL Right 2/13/2019    Procedure: HERNIORRHAPHY INGUINAL, RIGHT WITH MESH;  Surgeon: Fidencio Barajas MD;  Location: [facility] OR     NO HISTORY OF SURGERY         Family History:    Family History   Problem Relation Age of Onset     Cancer Father 70        passed 2016      C.A.D. No family hx of      Diabetes No family hx of      Hypertension No family hx of      Cerebrovascular Disease No family hx of      Breast Cancer No family hx of      Cancer - colorectal No family hx of      Prostate Cancer No family hx of        Social History:  Marital Status:   [2]  Social History     Tobacco Use     Smoking status: Never Smoker     Smokeless tobacco: Never Used   Substance Use Topics     Alcohol use: Yes     Comment: 2 drinks per week     Drug use: No        Medications:    omeprazole (PRILOSEC) 40 MG DR capsule          Review of Systems   Constitutional: Negative for chills, diaphoresis and fever.   HENT: Negative for ear pain, sinus pressure and sore throat.    Eyes: Negative for visual disturbance.   Respiratory: Negative for cough, shortness of breath and wheezing.    Cardiovascular: Negative for chest pain and palpitations.   Gastrointestinal: Negative for abdominal pain, blood in stool, constipation, diarrhea, nausea and vomiting.   Genitourinary: Negative for dysuria, frequency and urgency.   Skin: Negative for rash.   Neurological: Negative for headaches.   All other systems reviewed and are negative.      Physical Exam   BP: (!) 155/88  Pulse: 57  Temp: 98.3  F (36.8  C)  Resp: 18  Weight: 70.3 kg (155 lb)  SpO2: 97 %      Physical Exam  Constitutional:       General: He is not in acute distress.  HENT:      Nose: Nose normal.   Eyes:      Conjunctiva/sclera:  Conjunctivae normal.   Neck:      Musculoskeletal: Neck supple.   Cardiovascular:      Rate and Rhythm: Normal rate and regular rhythm.      Pulses:           Radial pulses are 2+ on the right side and 2+ on the left side.      Heart sounds: No murmur.   Pulmonary:      Effort: Pulmonary effort is normal. No respiratory distress.      Breath sounds: Normal breath sounds. No stridor. No wheezing or rhonchi.   Musculoskeletal:      Right hand: He exhibits normal range of motion, no tenderness, no bony tenderness, normal capillary refill, no deformity, no laceration and no swelling. Normal sensation noted. Decreased sensation is not present in the ulnar distribution, is not present in the medial distribution and is not present in the radial distribution. Normal strength noted. He exhibits no finger abduction, no thumb/finger opposition and no wrist extension trouble.      Right lower leg: No edema.      Left lower leg: No edema.   Skin:     Coloration: Skin is not pale.      Findings: No rash.   Neurological:      Mental Status: He is alert.     The DIP PIP and MCP joints with full range of motion against resistance in both flexion and extension.  No obvious color change at this time no significant swelling of the digits - other than mild swelling of the middle phalax RT hand middle finger.  Some variable distal sensation reduced of the right middle finger.    ED Course        Procedures               Critical Care time:  none          EKG Interpretation:      Interpreted by Mike Enriquez MD  EKG done at 1119 hrs. demonstrates a sinus rhythm at 59 bpm with a normal axis and no ST change.  There is a biphasic T wave in lead V2 and V3.  There is delayed R wave progression in v3 through v5.  No Q waves.  Normal intervals.  Normal conduction.  No ectopy.   Impression sinus rhythm 59 bpm and no old EKG to compare.            Results for orders placed or performed during the hospital encounter of 04/10/20 (from the past 24  hour(s))   CBC with platelets differential   Result Value Ref Range    WBC 6.9 4.0 - 11.0 10e9/L    RBC Count 5.81 4.4 - 5.9 10e12/L    Hemoglobin 16.5 13.3 - 17.7 g/dL    Hematocrit 47.9 40.0 - 53.0 %    MCV 82 78 - 100 fl    MCH 28.4 26.5 - 33.0 pg    MCHC 34.4 31.5 - 36.5 g/dL    RDW 11.9 10.0 - 15.0 %    Platelet Count 300 150 - 450 10e9/L    Diff Method Automated Method     % Neutrophils 61.9 %    % Lymphocytes 25.2 %    % Monocytes 7.6 %    % Eosinophils 4.5 %    % Basophils 0.7 %    % Immature Granulocytes 0.1 %    Nucleated RBCs 0 0 /100    Absolute Neutrophil 4.3 1.6 - 8.3 10e9/L    Absolute Lymphocytes 1.7 0.8 - 5.3 10e9/L    Absolute Monocytes 0.5 0.0 - 1.3 10e9/L    Absolute Eosinophils 0.3 0.0 - 0.7 10e9/L    Absolute Basophils 0.1 0.0 - 0.2 10e9/L    Abs Immature Granulocytes 0.0 0 - 0.4 10e9/L    Absolute Nucleated RBC 0.0    Basic metabolic panel   Result Value Ref Range    Sodium 140 133 - 144 mmol/L    Potassium 4.0 3.4 - 5.3 mmol/L    Chloride 107 94 - 109 mmol/L    Carbon Dioxide 30 20 - 32 mmol/L    Anion Gap 3 3 - 14 mmol/L    Glucose 94 70 - 99 mg/dL    Urea Nitrogen 22 7 - 30 mg/dL    Creatinine 0.98 0.66 - 1.25 mg/dL    GFR Estimate >90 >60 mL/min/[1.73_m2]    GFR Estimate If Black >90 >60 mL/min/[1.73_m2]    Calcium 9.6 8.5 - 10.1 mg/dL   XR Finger Right G/E 2 Views    Narrative    FINGER(S) TWO-THREE VIEWS RIGHT  4/10/2020 11:54 AM     HISTORY: resolved distal digital ischemia    COMPARISON: None.      Impression    IMPRESSION: No acute fracture or malalignment. There is normal joint  spacing. Mild soft tissue swelling.    PERRY MCBRIDE MD       Medications - No data to display    Assessments & Plan (with Medical Decision Making)     MDM: Lonnie Hester is a 45 year old male who presents with possible transient digital ischemia middle finger on the right hand.  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  He has no history of tobacco abuse, vascular disease, atrial fibrillation and his EKG today is in normal sinus rhythm.  No cardiopulmonary symptoms.  No history of VTE and no history of IV drug abuse or other potential embolic sources.  Discussed his case with several consultants including vascular surgery [facility] who had recommended that if there is any concern for hyperthenar hammer syndrome in which she would have used his palm for significant hammering, then ultrasound arterial of the palmar arch should be considered to exclude aneurysm.  I spoke to ultrasound at [facility] and they tell me that they do not do this particular ultrasound.  He will need to have it done at a larger center such as [facility].   Discussed with  orthopedics at [facility] hand surgery -who had no additional advice related to this.  They noted that MRA could be considered.  At this point his findings are reassuring but I have placed a consultation for arterial ultrasound urgent so that he may pursue this depending on symptoms.  I have also placed a consult consult for urgent follow-up with hand surgery.  I given precautions for return to the emergency department.  As part of the evaluation today CBC to exclude polycythemia and chemistry panel also performed and reassuring.  X-ray of the finger demonstrates no obvious fracture or other cause for symptoms experienced.  Precautions are given for return.  I have reviewed the nursing notes.    I have reviewed the findings, diagnosis, plan and need for follow up with the patient.       New Prescriptions    No medications on file       Final diagnoses:   Ischemia of finger?? - Unclear cause or if this was ischemia vs trauma.  There is a risk of palmar arch aneurysm due to hypothenar hammer syndrome as discussed with vascular.  unable to ultrasound this here, but consult written for ultrasound.   follow-up ortho-hand.  return for recurrent signs of decreased blood supply of finfger. avoid overuse. note for work       4/10/2020   [facility] EMERGENCY DEPARTMENT     Mike Enriquez MD  04/10/20 1504       Mike Enriquez MD  04/10/20 3301

## 2020-04-10 NOTE — ED NOTES
Pt here with complaints of discoloration, swelling and numbness to the right middle finger. The patient is asymptomatic, currently. These symptoms have resolved since being in the room. The symptoms started at 0900 this a.m.

## 2020-04-10 NOTE — ED AVS SNAPSHOT
[facility] Emergency Department  [address] [address]  Phone:  [phone]  Fax:  [phone]                                    Lonnie Hester   MRN: [id number]    Department:  [facility] Emergency Department   Date of Visit:  4/10/2020           After Visit Summary Signature Page    I have received my discharge instructions, and my questions have been answered. I have discussed any challenges I see with this plan with the nurse or doctor.    ..........................................................................................................................................  Patient/Patient Representative Signature      ..........................................................................................................................................  Patient Representative Print Name and Relationship to Patient    ..................................................               ................................................  Date                                   Time    ..........................................................................................................................................  Reviewed by Signature/Title    ...................................................              ..............................................  Date                                               Time          22EPIC Rev 08/18

## 2020-04-10 NOTE — LETTER
[facility] EMERGENCY DEPARTMENT  [address] [address]  Phone: [phone]  Fax: [phone]    REPORT OF WORK ABILITY    NOTE TO EMPLOYEE: You must promptly provide a copy of this report to your  employer or worker's compensation insurer, and Qualified Rehabilitation Consultant.  Date: 4/10/2020                     Employee Name: Lonnie Hester         YOB: 1975  Medical Record Number: [id number]   Soc.Sec.No: [id number]  Employer: [employer] Date of Injury: April 10, 2020   Managed Care Organization / Insurance Company Name: UNKNOWN  Diagnosis:     ICD-10-CM    1. Ischemia of finger??  I99.8 Orthopedic & Spine  Referral     US Arterial upper extremity rt    Unclear cause or if this was ischemia vs trauma.  There is a risk of palmar arch aneurysm due to hypothenar hammer syndrome as discussed with vascular.  unable to ultrasound this here, but consult written for ultrasound.  follow-up ortho-hand.  return for recurrent signs of decreased blood supply of finfger. avoid overuse. note for work     Work Related: unknown     MMI: UNKNOWN   Permanent Partial Disability(PPD) likely:  UNKNOWN    EMPLOYEE IS ABLE TO WORK: OFF work for remainder of shift and with restrictions from April 13, 2020 to April 17, 2020 -  Full shift     RESTRICTIONS IF ANY:     Hand/Wrist:  right Avoid gripping/grasping, Avoid repetitive motion, No operating machine/vibrating tools and Rotate activities/position    OTHER RESTRICTIONS: None    TREATMENT PLAN/NOTES:     1. Ischemia of finger??  I99.8 Orthopedic & Spine  Referral     US Arterial upper extremity rt    Unclear cause or if this was ischemia vs trauma.  There is a risk of palmar arch aneurysm due to hypothenar hammer syndrome as discussed with vascular.  unable to ultrasound this here, but consult written for ultrasound.  follow-up ortho-hand.  return for recurrent signs of decreased blood supply of finfger. avoid overuse. note  for work

## 2020-06-02 ENCOUNTER — ANCILLARY PROCEDURE (OUTPATIENT)
Dept: ULTRASOUND IMAGING | Facility: CLINIC | Age: 45
End: 2020-06-02
Attending: FAMILY MEDICINE
Payer: COMMERCIAL

## 2020-06-02 DIAGNOSIS — I99.8 ISCHEMIA OF FINGER: ICD-10-CM

## 2020-08-12 ENCOUNTER — VIRTUAL VISIT (OUTPATIENT)
Dept: FAMILY MEDICINE | Facility: CLINIC | Age: 45
End: 2020-08-12
Payer: COMMERCIAL

## 2020-08-12 DIAGNOSIS — R10.31 RIGHT INGUINAL PAIN: Primary | ICD-10-CM

## 2020-08-12 PROCEDURE — 99214 OFFICE O/P EST MOD 30 MIN: CPT | Mod: TEL | Performed by: FAMILY MEDICINE

## 2020-08-12 ASSESSMENT — ENCOUNTER SYMPTOMS
WEAKNESS: 0
SORE THROAT: 0
COUGH: 0
DIZZINESS: 0
CHILLS: 0
MYALGIAS: 0
JOINT SWELLING: 0
ARTHRALGIAS: 0
SHORTNESS OF BREATH: 0
PARESTHESIAS: 0
PALPITATIONS: 0
HEADACHES: 0
FEVER: 0
NERVOUS/ANXIOUS: 0

## 2020-08-12 NOTE — PROGRESS NOTES
"Lonnie Hester is a 45 year old male who is being evaluated via a billable telephone visit.      The patient has been notified of following:     \"This telephone visit will be conducted via a call between you and your physician/provider. We have found that certain health care needs can be provided without the need for a physical exam.  This service lets us provide the care you need with a short phone conversation.  If a prescription is necessary we can send it directly to your pharmacy.  If lab work is needed we can place an order for that and you can then stop by our lab to have the test done at a later time.    Telephone visits are billed at different rates depending on your insurance coverage. During this emergency period, for some insurers they may be billed the same as an in-person visit.  Please reach out to your insurance provider with any questions.    If during the course of the call the physician/provider feels a telephone visit is not appropriate, you will not be charged for this service.\"    Patient has given verbal consent for Telephone visit?  Yes    What phone number would you like to be contacted at? [phone]    How would you like to obtain your AVS? Jaihart    Subjective     Lonnie Hester is a 45 year old male who presents via phone visit today for the following health issues:    HPI        Groin Pain x3 days  Sharp pain will occur with certain movements, worsens with bending.   Had hernia surgery last year, pain is located below the incision.  Stayed home from work yesterday and pain has improved with being less active.   Denies any urinary symptoms, no testicular pain masses or lumps.     Patient Active Problem List   Diagnosis     Insomnia     Esophageal reflux     Impacted cerumen     LEFT HAND DISCOMFORT     CARDIOVASCULAR SCREENING; LDL GOAL LESS THAN 130     Right inguinal hernia     Past Surgical History:   Procedure Laterality Date     HERNIORRHAPHY INGUINAL Right 2/13/2019    Procedure: " HERNIORRHAPHY INGUINAL, RIGHT WITH MESH;  Surgeon: Fidencio Barajas MD;  Location: [facility] OR     NO HISTORY OF SURGERY         Social History     Tobacco Use     Smoking status: Never Smoker     Smokeless tobacco: Never Used   Substance Use Topics     Alcohol use: Yes     Comment: 2 drinks per week     Family History   Problem Relation Age of Onset     Cancer Father 70        passed 2016      C.A.D. No family hx of      Diabetes No family hx of      Hypertension No family hx of      Cerebrovascular Disease No family hx of      Breast Cancer No family hx of      Cancer - colorectal No family hx of      Prostate Cancer No family hx of          Current Outpatient Medications   Medication Sig Dispense Refill     omeprazole (PRILOSEC) 40 MG DR capsule Take 1 capsule (40 mg) by mouth daily 90 capsule 3     No Known Allergies    Reviewed and updated as needed this visit by Provider    1. Right hernia pain:  Below incision site.  S/P hernia repair 02/2019.  Started two days ago but yesterday got bad.  Certain movements make it worse.  Bending down and sqatting.  Sharp shooting pain.  Today, little better day.  No shooting.  Substantially better.  No trauma but returned from golfing.  Denies any bulge.  No changes in regards to bowel movement.  Took ibuprofen with some improvement.  No testicular pain.  No urinary symptoms.  No redness. No changes in regards to bowel movements.     Review of Systems   Constitutional: Negative for chills and fever.   HENT: Negative for congestion, ear pain, hearing loss and sore throat.    Respiratory: Negative for cough and shortness of breath.    Cardiovascular: Negative for chest pain, palpitations and peripheral edema.   Genitourinary:        Right inguinal pain   Musculoskeletal: Negative for arthralgias, joint swelling and myalgias.   Skin: Negative for rash.   Neurological: Negative for dizziness, weakness, headaches and paresthesias.   Psychiatric/Behavioral: Negative for mood  changes. The patient is not nervous/anxious.         Vitals:  No vitals were obtained today due to virtual visit.    healthy, alert and no distress  PSYCH: Alert and oriented times 3; coherent speech, normal   rate and volume, able to articulate logical thoughts, able   to abstract reason, no tangential thoughts, no hallucinations   or delusions  His affect is normal  RESP: No cough, no audible wheezing, able to talk in full sentences  Remainder of exam unable to be completed due to telephone visits    Assessment & Plan     1. Right inguinal pain  History of right inguinal repair.  Pain now improved.  DDx in recurrent hernia, hernia strain, versus epididymitis.  Supportive care now.  May take ibuprofen as needed.  Avoid strenuous activities.  If symptoms do not improve, consider general surgery referral.  Patient voiced understanding.     See Patient Instructions    No follow-ups on file.    Luis Alberto Colin DO  [facility]    No follow-ups on file.     Luis Alberto Colin, DO

## 2020-11-29 ENCOUNTER — HEALTH MAINTENANCE LETTER (OUTPATIENT)
Age: 45
End: 2020-11-29

## 2021-01-15 ENCOUNTER — HEALTH MAINTENANCE LETTER (OUTPATIENT)
Age: 46
End: 2021-01-15

## 2021-02-23 DIAGNOSIS — K21.00 GASTROESOPHAGEAL REFLUX DISEASE WITH ESOPHAGITIS: ICD-10-CM

## 2021-02-23 RX ORDER — OMEPRAZOLE 40 MG/1
40 CAPSULE, DELAYED RELEASE ORAL DAILY
Qty: 30 CAPSULE | Refills: 0 | Status: SHIPPED | OUTPATIENT
Start: 2021-02-23 | End: 2021-06-04

## 2021-02-23 NOTE — TELEPHONE ENCOUNTER
"Requested Prescriptions   Pending Prescriptions Disp Refills     omeprazole (PRILOSEC) 40 MG DR capsule 90 capsule 3     Sig: Take 1 capsule (40 mg) by mouth daily       PPI Protocol Passed - 2/23/2021  7:26 AM        Passed - Not on Clopidogrel (unless Pantoprazole ordered)        Passed - No diagnosis of osteoporosis on record        Passed - Recent (12 mo) or future (30 days) visit within the authorizing provider's specialty     Patient has had an office visit with the authorizing provider or a provider within the authorizing providers department within the previous 12 mos or has a future within next 30 days. See \"Patient Info\" tab in inbasket, or \"Choose Columns\" in Meds & Orders section of the refill encounter.              Passed - Medication is active on med list        Passed - Patient is age 18 or older           Routing refill request to provider for review/approval because:  [place] for physical.  Refilled x 1.    Keysha ZIMMERMAN-RN  Triage Nurse  [facility]: [facility]            "

## 2021-09-25 ENCOUNTER — HEALTH MAINTENANCE LETTER (OUTPATIENT)
Age: 46
End: 2021-09-25

## 2022-03-12 ENCOUNTER — HEALTH MAINTENANCE LETTER (OUTPATIENT)
Age: 47
End: 2022-03-12

## 2022-12-26 ENCOUNTER — HEALTH MAINTENANCE LETTER (OUTPATIENT)
Age: 47
End: 2022-12-26

## 2023-04-22 ENCOUNTER — HEALTH MAINTENANCE LETTER (OUTPATIENT)
Age: 48
End: 2023-04-22

## (undated) DEVICE — DRSG ABDOMINAL 07 1/2X8" 7197D

## (undated) DEVICE — DRSG TEGADERM 4X4 3/4" 1626W

## (undated) DEVICE — DRSG STERI STRIP 1/2X4" R1547

## (undated) DEVICE — DRAPE IOBAN INCISE 23X17" 6650EZ

## (undated) DEVICE — PREP CHLORAPREP 26ML TINTED ORANGE  260815

## (undated) DEVICE — SUCTION TIP YANKAUER W/O VENT K86

## (undated) DEVICE — SU MONOCRYL 4-0 PS-2 18" UND Y496G

## (undated) DEVICE — PACK MINOR SBA15MIFSE

## (undated) DEVICE — SOL ADH LIQUID BENZOIN SWAB 0.6ML C1544

## (undated) DEVICE — BNDG ABDOMINAL BINDER 09X46-62"

## (undated) DEVICE — DRAIN PENROSE 0.25"X18" LATEX FREE GR201

## (undated) DEVICE — SOL WATER IRRIG 1000ML BOTTLE 07139-09

## (undated) DEVICE — GLOVE PROTEXIS W/NEU-THERA 7.5  2D73TE75

## (undated) DEVICE — SU VICRYL 3-0 SH 27" UND J416H

## (undated) DEVICE — SU VICRYL 0 CT-2 27" UND J270H

## (undated) DEVICE — DRAPE LAP W/ARMBOARD 29410

## (undated) RX ORDER — DEXAMETHASONE SODIUM PHOSPHATE 4 MG/ML
INJECTION, SOLUTION INTRA-ARTICULAR; INTRALESIONAL; INTRAMUSCULAR; INTRAVENOUS; SOFT TISSUE
Status: DISPENSED
Start: 2019-02-13

## (undated) RX ORDER — BUPIVACAINE HYDROCHLORIDE 2.5 MG/ML
INJECTION, SOLUTION INFILTRATION; PERINEURAL
Status: DISPENSED
Start: 2019-02-13

## (undated) RX ORDER — GABAPENTIN 300 MG/1
CAPSULE ORAL
Status: DISPENSED
Start: 2019-02-13

## (undated) RX ORDER — CEFAZOLIN SODIUM 2 G/100ML
INJECTION, SOLUTION INTRAVENOUS
Status: DISPENSED
Start: 2019-02-13

## (undated) RX ORDER — FENTANYL CITRATE 50 UG/ML
INJECTION, SOLUTION INTRAMUSCULAR; INTRAVENOUS
Status: DISPENSED
Start: 2019-02-13

## (undated) RX ORDER — EPINEPHRINE 1 MG/ML
INJECTION, SOLUTION INTRAMUSCULAR; SUBCUTANEOUS
Status: DISPENSED
Start: 2019-02-13

## (undated) RX ORDER — LIDOCAINE HYDROCHLORIDE 20 MG/ML
INJECTION, SOLUTION INFILTRATION; PERINEURAL
Status: DISPENSED
Start: 2019-02-13

## (undated) RX ORDER — KETOROLAC TROMETHAMINE 30 MG/ML
INJECTION, SOLUTION INTRAMUSCULAR; INTRAVENOUS
Status: DISPENSED
Start: 2019-02-13

## (undated) RX ORDER — PROPOFOL 10 MG/ML
INJECTION, EMULSION INTRAVENOUS
Status: DISPENSED
Start: 2019-02-13

## (undated) RX ORDER — ACETAMINOPHEN 325 MG/1
TABLET ORAL
Status: DISPENSED
Start: 2019-02-13

## (undated) RX ORDER — CEFAZOLIN SODIUM 1 G/3ML
INJECTION, POWDER, FOR SOLUTION INTRAMUSCULAR; INTRAVENOUS
Status: DISPENSED
Start: 2019-02-13

## (undated) RX ORDER — EPHEDRINE SULFATE 50 MG/ML
INJECTION, SOLUTION INTRAVENOUS
Status: DISPENSED
Start: 2019-02-13

## (undated) RX ORDER — ONDANSETRON 2 MG/ML
INJECTION INTRAMUSCULAR; INTRAVENOUS
Status: DISPENSED
Start: 2019-02-13